# Patient Record
Sex: MALE | Race: WHITE | NOT HISPANIC OR LATINO | Employment: OTHER | URBAN - METROPOLITAN AREA
[De-identification: names, ages, dates, MRNs, and addresses within clinical notes are randomized per-mention and may not be internally consistent; named-entity substitution may affect disease eponyms.]

---

## 2017-10-17 ENCOUNTER — HOSPITAL ENCOUNTER (INPATIENT)
Facility: HOSPITAL | Age: 57
LOS: 2 days | Discharge: HOME | DRG: 065 | End: 2017-10-19
Attending: EMERGENCY MEDICINE | Admitting: NEUROLOGICAL SURGERY
Payer: COMMERCIAL

## 2017-10-17 DIAGNOSIS — I49.3 PVC (PREMATURE VENTRICULAR CONTRACTION): ICD-10-CM

## 2017-10-17 DIAGNOSIS — R00.2 HEART PALPITATIONS: ICD-10-CM

## 2017-10-17 DIAGNOSIS — I61.9 ICH (INTRACEREBRAL HEMORRHAGE): Primary | ICD-10-CM

## 2017-10-17 DIAGNOSIS — I44.0 AV BLOCK, 1ST DEGREE: ICD-10-CM

## 2017-10-17 DIAGNOSIS — R00.2 PALPITATIONS: ICD-10-CM

## 2017-10-17 LAB
APTT BLDCRRT: 23.7 SEC
INR PPP: 1
PROTHROMBIN TIME: 11.1 SEC

## 2017-10-17 PROCEDURE — 85610 PROTHROMBIN TIME: CPT

## 2017-10-17 PROCEDURE — 99233 SBSQ HOSP IP/OBS HIGH 50: CPT | Mod: ,,, | Performed by: PSYCHIATRY & NEUROLOGY

## 2017-10-17 PROCEDURE — 86850 RBC ANTIBODY SCREEN: CPT

## 2017-10-17 PROCEDURE — 99285 EMERGENCY DEPT VISIT HI MDM: CPT | Mod: 25

## 2017-10-17 PROCEDURE — 96361 HYDRATE IV INFUSION ADD-ON: CPT

## 2017-10-17 PROCEDURE — 12000002 HC ACUTE/MED SURGE SEMI-PRIVATE ROOM

## 2017-10-17 PROCEDURE — 96365 THER/PROPH/DIAG IV INF INIT: CPT

## 2017-10-17 PROCEDURE — 85730 THROMBOPLASTIN TIME PARTIAL: CPT

## 2017-10-17 PROCEDURE — 80307 DRUG TEST PRSMV CHEM ANLYZR: CPT

## 2017-10-17 PROCEDURE — 86900 BLOOD TYPING SEROLOGIC ABO: CPT

## 2017-10-17 PROCEDURE — 99284 EMERGENCY DEPT VISIT MOD MDM: CPT | Mod: ,,, | Performed by: EMERGENCY MEDICINE

## 2017-10-17 RX ORDER — ACEBUTOLOL HYDROCHLORIDE 200 MG/1
200 CAPSULE ORAL 2 TIMES DAILY
Status: DISCONTINUED | OUTPATIENT
Start: 2017-10-18 | End: 2017-10-19 | Stop reason: HOSPADM

## 2017-10-17 RX ORDER — ACETAMINOPHEN 10 MG/ML
1000 INJECTION, SOLUTION INTRAVENOUS EVERY 8 HOURS
Status: COMPLETED | OUTPATIENT
Start: 2017-10-17 | End: 2017-10-18

## 2017-10-17 RX ORDER — DIPHENHYDRAMINE HYDROCHLORIDE 50 MG/ML
50 INJECTION INTRAMUSCULAR; INTRAVENOUS ONCE
Status: COMPLETED | OUTPATIENT
Start: 2017-10-18 | End: 2017-10-18

## 2017-10-17 RX ORDER — IBUPROFEN 200 MG
24 TABLET ORAL
Status: DISCONTINUED | OUTPATIENT
Start: 2017-10-18 | End: 2017-10-19 | Stop reason: HOSPADM

## 2017-10-17 RX ORDER — FLUOXETINE HYDROCHLORIDE 20 MG/1
20 CAPSULE ORAL DAILY
Status: DISCONTINUED | OUTPATIENT
Start: 2017-10-18 | End: 2017-10-19 | Stop reason: HOSPADM

## 2017-10-17 RX ORDER — GLUCAGON 1 MG
1 KIT INJECTION
Status: DISCONTINUED | OUTPATIENT
Start: 2017-10-18 | End: 2017-10-19 | Stop reason: HOSPADM

## 2017-10-17 RX ORDER — SODIUM CHLORIDE 9 MG/ML
INJECTION, SOLUTION INTRAVENOUS CONTINUOUS
Status: DISCONTINUED | OUTPATIENT
Start: 2017-10-18 | End: 2017-10-18

## 2017-10-17 RX ORDER — IBUPROFEN 200 MG
16 TABLET ORAL
Status: DISCONTINUED | OUTPATIENT
Start: 2017-10-18 | End: 2017-10-19 | Stop reason: HOSPADM

## 2017-10-17 RX ORDER — PANTOPRAZOLE SODIUM 40 MG/1
40 TABLET, DELAYED RELEASE ORAL DAILY
Status: DISCONTINUED | OUTPATIENT
Start: 2017-10-18 | End: 2017-10-19 | Stop reason: HOSPADM

## 2017-10-18 PROBLEM — R00.2 HEART PALPITATIONS: Status: ACTIVE | Noted: 2017-10-18

## 2017-10-18 PROBLEM — I49.3 PVC (PREMATURE VENTRICULAR CONTRACTION): Status: ACTIVE | Noted: 2017-10-18

## 2017-10-18 LAB
ABO + RH BLD: NORMAL
AMPHET+METHAMPHET UR QL: NEGATIVE
ANION GAP SERPL CALC-SCNC: 10 MMOL/L
BARBITURATES UR QL SCN>200 NG/ML: NEGATIVE
BENZODIAZ UR QL SCN>200 NG/ML: NEGATIVE
BLD GP AB SCN CELLS X3 SERPL QL: NORMAL
BUN SERPL-MCNC: 15 MG/DL
BZE UR QL SCN: NEGATIVE
CALCIUM SERPL-MCNC: 8.9 MG/DL
CANNABINOIDS UR QL SCN: NEGATIVE
CHLORIDE SERPL-SCNC: 104 MMOL/L
CO2 SERPL-SCNC: 25 MMOL/L
CREAT SERPL-MCNC: 0.9 MG/DL
CREAT UR-MCNC: 104 MG/DL
DIASTOLIC DYSFUNCTION: NO
EST. GFR  (AFRICAN AMERICAN): >60 ML/MIN/1.73 M^2
EST. GFR  (NON AFRICAN AMERICAN): >60 ML/MIN/1.73 M^2
ESTIMATED PA SYSTOLIC PRESSURE: 22.09
ETHANOL UR-MCNC: <10 MG/DL
GLUCOSE SERPL-MCNC: 106 MG/DL
METHADONE UR QL SCN>300 NG/ML: NEGATIVE
MITRAL VALVE REGURGITATION: NORMAL
OPIATES UR QL SCN: NEGATIVE
PCP UR QL SCN>25 NG/ML: NEGATIVE
POTASSIUM SERPL-SCNC: 4.1 MMOL/L
RETIRED EF AND QEF - SEE NOTES: 65 (ref 55–65)
SODIUM SERPL-SCNC: 139 MMOL/L
TOXICOLOGY INFORMATION: NORMAL
TRICUSPID VALVE REGURGITATION: NORMAL
TSH SERPL DL<=0.005 MIU/L-ACNC: 1.1 UIU/ML

## 2017-10-18 PROCEDURE — 99233 SBSQ HOSP IP/OBS HIGH 50: CPT | Mod: ,,, | Performed by: PSYCHIATRY & NEUROLOGY

## 2017-10-18 PROCEDURE — 93005 ELECTROCARDIOGRAM TRACING: CPT

## 2017-10-18 PROCEDURE — 20600001 HC STEP DOWN PRIVATE ROOM

## 2017-10-18 PROCEDURE — 25000003 PHARM REV CODE 250: Performed by: STUDENT IN AN ORGANIZED HEALTH CARE EDUCATION/TRAINING PROGRAM

## 2017-10-18 PROCEDURE — 36415 COLL VENOUS BLD VENIPUNCTURE: CPT

## 2017-10-18 PROCEDURE — 93306 TTE W/DOPPLER COMPLETE: CPT

## 2017-10-18 PROCEDURE — 80048 BASIC METABOLIC PNL TOTAL CA: CPT

## 2017-10-18 PROCEDURE — 63600175 PHARM REV CODE 636 W HCPCS: Performed by: STUDENT IN AN ORGANIZED HEALTH CARE EDUCATION/TRAINING PROGRAM

## 2017-10-18 PROCEDURE — 99233 SBSQ HOSP IP/OBS HIGH 50: CPT | Mod: ,,, | Performed by: INTERNAL MEDICINE

## 2017-10-18 PROCEDURE — 84443 ASSAY THYROID STIM HORMONE: CPT

## 2017-10-18 PROCEDURE — 25500020 PHARM REV CODE 255: Performed by: NEUROLOGICAL SURGERY

## 2017-10-18 PROCEDURE — 93306 TTE W/DOPPLER COMPLETE: CPT | Mod: 26,,, | Performed by: INTERNAL MEDICINE

## 2017-10-18 PROCEDURE — 93010 ELECTROCARDIOGRAM REPORT: CPT | Mod: ,,, | Performed by: INTERNAL MEDICINE

## 2017-10-18 RX ORDER — ACETAMINOPHEN 325 MG/1
650 TABLET ORAL EVERY 6 HOURS PRN
Status: DISCONTINUED | OUTPATIENT
Start: 2017-10-18 | End: 2017-10-19 | Stop reason: HOSPADM

## 2017-10-18 RX ADMIN — FLUOXETINE 20 MG: 20 CAPSULE ORAL at 08:10

## 2017-10-18 RX ADMIN — PREDNISONE 50 MG: 20 TABLET ORAL at 12:10

## 2017-10-18 RX ADMIN — IOHEXOL 100 ML: 350 INJECTION, SOLUTION INTRAVENOUS at 04:10

## 2017-10-18 RX ADMIN — ACETAMINOPHEN 1000 MG: 10 INJECTION, SOLUTION INTRAVENOUS at 01:10

## 2017-10-18 RX ADMIN — ACETAMINOPHEN 1000 MG: 10 INJECTION, SOLUTION INTRAVENOUS at 05:10

## 2017-10-18 RX ADMIN — PANTOPRAZOLE SODIUM 40 MG: 40 TABLET, DELAYED RELEASE ORAL at 08:10

## 2017-10-18 RX ADMIN — ACETAMINOPHEN 650 MG: 325 TABLET ORAL at 10:10

## 2017-10-18 RX ADMIN — SODIUM CHLORIDE: 0.9 INJECTION, SOLUTION INTRAVENOUS at 12:10

## 2017-10-18 RX ADMIN — PREDNISONE 50 MG: 20 TABLET ORAL at 05:10

## 2017-10-18 RX ADMIN — ACETAMINOPHEN 1000 MG: 10 INJECTION, SOLUTION INTRAVENOUS at 12:10

## 2017-10-18 RX ADMIN — PREDNISONE 50 MG: 20 TABLET ORAL at 02:10

## 2017-10-18 RX ADMIN — ACEBUTOLOL HYDROCHLORIDE 200 MG: 200 CAPSULE ORAL at 08:10

## 2017-10-18 RX ADMIN — ACEBUTOLOL HYDROCHLORIDE 200 MG: 200 CAPSULE ORAL at 10:10

## 2017-10-18 RX ADMIN — DIPHENHYDRAMINE HYDROCHLORIDE 50 MG: 50 INJECTION, SOLUTION INTRAMUSCULAR; INTRAVENOUS at 12:10

## 2017-10-18 NOTE — ED PROVIDER NOTES
Encounter Date: 10/17/2017       History     Chief Complaint   [facility]   [facility]      pt presents to to the ed from [facility] for neurosurgical eval of a head GCS is 15 pt presents AAOX4     Patient is a 57 y.o. Man with new onset palpitations this morning. Patient became diaphoretic and light headed when he noticed it. He also reported new onset of headache at this time. Patient was transferred to [facility] for elevated level of care. Patient received in stable condition, neuro intact AAO x 4, complaining of only a slight headache. NAD, resting comfortably in bed. Patient denies any s/s of chest pain or discomfort at this time. Patient is neurologically intact GCS 15, no neuro deficits.     Neurosurgery was contacted and made aware of the patient came in from [facility] and is in room 6.           Review of patient's allergies indicates:   Allergen Reactions    Codeine Hallucinations    Iodine and iodide containing products Itching     Past Medical History:   Diagnosis Date    PVC (premature ventricular contraction)      Past Surgical History:   Procedure Laterality Date    APPENDECTOMY      CHOLECYSTECTOMY       No family history on file.  Social History   Substance Use Topics    Smoking status: Never Smoker    Smokeless tobacco: Never Used    Alcohol use No     Review of Systems   Constitutional: Negative for appetite change, chills, diaphoresis, fatigue and fever.   HENT: Negative for rhinorrhea and sore throat.    Eyes: Negative for itching and visual disturbance.   Respiratory: Negative for cough, choking and shortness of breath.    Cardiovascular: Negative for chest pain, palpitations and leg swelling.   Gastrointestinal: Negative for abdominal pain, diarrhea, nausea and vomiting.   Endocrine: Negative for cold intolerance and heat intolerance.   Genitourinary: Negative for difficulty urinating.   Musculoskeletal: Negative for arthralgias, back pain and myalgias.   Skin:  Negative for color change.   Allergic/Immunologic: Negative for environmental allergies and food allergies.   Neurological: Positive for headaches.   Hematological: Does not bruise/bleed easily.   Psychiatric/Behavioral: Negative for suicidal ideas. The patient is not nervous/anxious.        Physical Exam     Initial Vitals [10/17/17 2113]   BP Pulse Resp Temp SpO2   135/72 63 18 98.2 °F (36.8 °C) 100 %      MAP       93         Physical Exam    Nursing note and vitals reviewed.  Constitutional: He appears well-developed and well-nourished. He is not diaphoretic. No distress.   HENT:   Head: Normocephalic and atraumatic.   Eyes: EOM are normal. Pupils are equal, round, and reactive to light. No scleral icterus.   Neck: Normal range of motion. Neck supple. No thyromegaly present. No JVD present.   Cardiovascular: Normal rate and normal heart sounds.   No murmur heard.  Pulmonary/Chest: Breath sounds normal. He has no wheezes.   Abdominal: Soft. Bowel sounds are normal.   Musculoskeletal: Normal range of motion. He exhibits no edema.   Neurological: He is alert and oriented to person, place, and time. He has normal strength and normal reflexes. He displays normal reflexes. No cranial nerve deficit or sensory deficit.   Skin: Skin is warm. Capillary refill takes less than 2 seconds.         ED Course   Procedures  Labs Reviewed   PROTIME-INR   APTT   TOXICOLOGY SCREEN, URINE, RANDOM (COMPLIANCE)   CBC W/ AUTO DIFFERENTIAL   BASIC METABOLIC PANEL   TYPE & SCREEN             Medical Decision Making:   ED Management:  10:49 PM  -neurosurgery consulted and contacted on phone  -patient neurologically intact, AAOx4  -hemodynamically stable at present time, sleeping in bed.  -easily arrousable, still AAOx4 and neuro intact    10:53 PM  -neurosurgery at bedside              Attending Attestation:   Physician Attestation Statement for Resident:  As the supervising MD   Physician Attestation Statement: I have personally seen and  examined this patient.   I agree with the above history. -:   As the supervising MD I agree with the above PE.    As the supervising MD I agree with the above treatment, course, plan, and disposition.   -: Aox3, gcs 15, airway patent.  Neurosurgery consult for ICH.    2:39 AM pt admitted to neurosurgery for further eval/mgt.    I have reviewed the following: records from a referring facility.                    ED Course      Clinical Impression:   The encounter diagnosis was ICH (intracerebral hemorrhage).                           Jaime Roy MD  10/18/17 0238

## 2017-10-18 NOTE — CONSULTS
[facility]  Vascular Neurology  Comprehensive Stroke Center  Consult Note    Consults  Assessment/Plan:     PVC (premature ventricular contraction)    -Stroke risk factor. 2D Echo pending.        ICH (intracerebral hemorrhage)    57 y.o. male with significant past medical history of PVC presented to hospital as a transfer from [facility] for evaluation of ICH.   -Antithrombotics for secondary stroke prevention: None: Reason:  Hemorrhagic Stroke  -Statins for secondary stroke prevention and hyperlipidemia, if present: Atorvastatin- 40 mg oral daily (may hold in setting of ICH)  -Aggressive risk factor modification: PVCs  -Rehab Efforts: None: Reason: No deficits  -Diagnostics: Pending CTA Head to assess vasculature , CTA Neck/Arch to assess vasculature, HgbA1C to assess blood glucose levels, Lipid Profile to assess cholesterol levels, MRI head without contrast to assess brain parenchyma, Trans-thoracic cardiac echo to assess cardiac function/status, TSH to assess thyroid function  -VTE Prophylaxis: None: Reason for No Pharmacological VTE Prophylaxis: HIstory of systemic or intracranial bleeding and Mechanical prophylaxis: Place SCDs    -SBP<160            Thrombolysis Candidate? No  1. Contraindications: History of intracranial hemorrhage or brain aneurysm or vascular malformation or brain tumor    Interventional Revascularization Candidate?  No; No large vessel occlusion    Research Candidate? Yes:  Other: MARISS    Subjective:     History of Present Illness:  Patient is a 57 y.o. male with significant past medical history of PVC presented to hospital as a transfer from [facility] for evaluation of ICH.  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  He also felt light headed.  Nothing preciptated or alleviated his symptoms.  On arrival to the [facility] ED the  "patient reported acute onset HA.  The patient denies N/V, fever, chills, or CP.  His initial BP was 180s/90s.  A CTH was obtained and revealed an IPH.  The patient was transferred to [facility] for further evaluation, close monitoring.  On arrival to the ED the patient remains w/complaint of HA, mild photophobia.  He denies N/V, weakness, numbness.  The patient endorses intermittent chest discomfort described as "fluttering".  The patient admitted by NSGY for further evaluation of ICH.                               Past Medical History:   Diagnosis Date    PVC (premature ventricular contraction)      Past Surgical History:   Procedure Laterality Date    APPENDECTOMY      CHOLECYSTECTOMY       History reviewed. No pertinent family history.  Social History   Substance Use Topics    Smoking status: Never Smoker    Smokeless tobacco: Never Used    Alcohol use No     Review of patient's allergies indicates:   Allergen Reactions    Codeine Hallucinations    Iodine and iodide containing products Itching     Medications: I have reviewed the current medication administration record.    Prescriptions Prior to Admission   Medication Sig Dispense Refill Last Dose    acebutolol (SECTRAL) 200 MG capsule Take 200 mg by mouth 2 (two) times daily.   10/17/2017    fluoxetine (PROZAC) 20 MG capsule Take 20 mg by mouth once daily.   10/17/2017    lansoprazole (PREVACID) 30 MG capsule Take 30 mg by mouth once daily.   10/17/2017       Review of Systems   Constitutional: Positive for diaphoresis. Negative for chills and fever.   HENT: Negative for drooling and trouble swallowing.    Eyes: Positive for photophobia. Negative for pain, redness and visual disturbance.   Respiratory: Negative for cough and shortness of breath.    Cardiovascular: Positive for palpitations. Negative for chest pain.   Gastrointestinal: Negative for abdominal pain, nausea and vomiting.   Endocrine: Negative for cold intolerance, heat intolerance " and polydipsia.   Genitourinary: Negative for dysuria and hematuria.   Musculoskeletal: Negative for neck pain and neck stiffness.   Skin: Negative for rash.   Allergic/Immunologic: Negative for environmental allergies and food allergies.   Neurological: Positive for light-headedness and headaches. Negative for dizziness, facial asymmetry, speech difficulty and weakness.   Hematological: Negative for adenopathy. Does not bruise/bleed easily.   Psychiatric/Behavioral: Negative for confusion.     Objective:     Vital Signs (Most Recent):  Temp: 98.2 °F (36.8 °C) (10/17/17 2113)  Pulse: (!) 54 (10/18/17 0102)  Resp: 19 (10/18/17 0112)  BP: 129/61 (10/18/17 0003)  SpO2: 97 % (10/18/17 0001)    Vital Signs Range (Last 24H):  Temp:  [97.4 °F (36.3 °C)-98.2 °F (36.8 °C)]   Pulse:  [50-63]   Resp:  [16-20]   BP: (129-186)/(61-91)   SpO2:  [97 %-100 %]     Physical Exam   Constitutional: He is oriented to person, place, and time. He appears well-developed and well-nourished.   HENT:   Head: Normocephalic and atraumatic.   Right Ear: External ear normal.   Left Ear: External ear normal.   Nose: Nose normal.   Mouth/Throat: Oropharynx is clear and moist.   Eyes: Conjunctivae and EOM are normal. Pupils are equal, round, and reactive to light. Right eye exhibits no discharge. Left eye exhibits no discharge. No scleral icterus.   Neck: Normal range of motion. Neck supple. No thyromegaly present.   Cardiovascular: Regular rhythm.  Bradycardia present.    Pulmonary/Chest: Effort normal.   Abdominal: He exhibits no distension. There is no tenderness.   Musculoskeletal: Normal range of motion. He exhibits no edema.   Lymphadenopathy:     He has no cervical adenopathy.   Neurological: He is alert and oriented to person, place, and time. GCS eye subscore is 4 - spontaneous. GCS verbal subscore is 5 - oriented. GCS motor subscore is 6 - obeys commands.   Skin: Skin is warm and dry. He is not diaphoretic.   Nursing note and vitals  reviewed.      Neurological Exam:   LOC: alert and follows requests  Language: No aphasia  Speech: No dysarthria  Orientation: Person, Place, Time  Visual Fields (CN II): Full  EOM (CN III, IV, VI): Full/intact  Pupils (CN III, IV, VI): PERRL  Facial Movement (CN VII): symmetric facial expression  Motor*: Arm Left:  Normal (5/5), Leg Left:   Normal (5/5), Arm Right:   Normal (5/5), Leg Right:   Normal (5/5)  Cerebellar*: no dysmetria  Sensation: intact to light touch, temperature and vibration    NIH Stroke Scale:  Interval: baseline (upon arrival/admit)  Level of Consciousness: 0 - alert  LOC Questions: 0 - answers both correctly  LOC Commands: 0 - performs both correctly  Best Gaze: 0 - normal  Visual: 0 - no visual loss  Facial Palsy: 0 - normal  Motor Left Arm: 0 - no drift  Motor Right Arm: 0 - no drift  Motor Left Le - no drift  Motor Right Le - no drift  Limb Ataxia: 0 - absent  Sensory: 0 - normal  Best Language: 0 - no aphasia  Dysarthria: 0 - normal articulation  Extinction and Inattention: 0 - no neglect  NIH Stroke Scale Total: 0  Phoebe Coma Scale:  Best Eye Response: 4 - spontaneous  Best Motor Response: 6 - obeys commands  Best Verbal Response: 5 - oriented  Parlin Coma Scale Total: 15  Modified Vanduser Scale:   Timeline: Prior to symptoms onset  Modified Jeanine Score: 0 - no symptoms    ICH Scale:   Phoebe Coma Score: 0 - 13-15  Age > or = 80: 0 - no  ICH Volume > or = 30 mL: 0 - no  Intraventricular Hemorrhage: 0 - no  Infratentorial Origin of Hemorrhage: 0 - no  ICH Scale Total: 0      Laboratory:  CMP:   Recent Labs  Lab 10/17/17  1343   CALCIUM 10.0      K 4.7   CO2 30*      BUN 12   CREATININE 1.00     CBC:   Recent Labs  Lab 10/17/17  1343   WBC 7.90   RBC 4.59*   HGB 14.0   HCT 41.4      MCV 90   MCH 30.4   MCHC 33.8     Lipid Panel: No results for input(s): CHOL, LDLCALC, HDL, TRIG in the last 168 hours.  Coagulation:   Recent Labs  Lab 10/17/17  2303   INR 1.0    APTT 23.7     Hgb A1C: No results for input(s): HGBA1C in the last 168 hours.  TSH: No results for input(s): TSH in the last 168 hours.    Diagnostic Results:  Brain Imaging: CT Head. Date: 10/18/17  Acute Pathology: ICH  Location: Parietal:  right  Old Vascular Pathology: None  Location: N/A    Cerebrovascular Imaging: pending    Cervical Vascular Imaging: pending    Cardiac Evaluation: pending  EKG/Telemetry: Sinus rhythm, bradycardia      Jeri Reaves NP  [facility]  Department of Vascular Neurology   [facility]

## 2017-10-18 NOTE — ED TRIAGE NOTES
Rubio Rodriguez, a 57 y.o. male presents to the ED as transfer from [facility] for neurosurgery consult. Pt reports palpitations earlier this morning while on boat. Pt was sent to [facility], and was diagnosed with a parenchymal 7mm Head bleed. Pt in NAD.       Chief Complaint   Patient presents with    transfer [facility]      pt presents to to the ed from [facility] for neurosurgical eval of a head GCS is 15 pt presents AAOX4     Review of patient's allergies indicates:   Allergen Reactions    Codeine Hallucinations    Iodine and iodide containing products Itching     Past Medical History:   Diagnosis Date    PVC (premature ventricular contraction)      LOC: Patient name and date of birth verified.  The patient is awake, alert and aware of environment with an appropriate affect, the patient is oriented x 3 and speaking appropriately.  Pt in NAD.    APPEARANCE: Patient resting comfortably and in no acute distress, patient is clean and well groomed, patient's clothing is properly fastened.  SKIN: The skin is warm and dry, color consistent with ethnicity, patient has normal skin turgor and moist mucus membranes, skin intact, no breakdown or brusing noted.  MUSCULOSKELETAL: Patient moving all extremities well, no obvious swelling or deformities noted.  RESPIRATORY: Airway is open and patent, respirations are spontaneous, patient has a normal effort and rate, no accessory muscle use noted.  CARDIAC: Patient has a normal rate and rhythm, no peripheral edema noted, capillary refill < 3 seconds.  ABDOMEN: Soft and non tender to palpation, no distention noted. Bowel sounds present in all four quadrants.  NEUROLOGIC: Eyes open spontaneously, behavior appropriate to situation, follows commands, facial expression symmetrical, bilateral hand grasp equal and even, purposeful motor response noted, normal sensation in all extremities when touched with a finger. Pt reports headache.

## 2017-10-18 NOTE — H&P
History and Physical  Neurosurgery    Admit Date: 10/17/2017  LOS: 1    Code Status: Full Code     CC: <principal problem not specified>    SUBJECTIVE:     History of Present Illness: 58 yo male with pmh of SVT presents as transfer for spontaneous R parietal ICH.    Pt says he felt a strange feeling in his chest and then developed a headache. He continues to have a moderate headache. No nuchal rigidty or phtophobia.     Pt is neurologically intact on exam.    Outside head CT shows small region of hyperdensity in R parietal lobe. MRI from OSH show interval stability with no evidence of mass.    Neurosurgery is consulted for ICH.    ICHS 0.             OBJECTIVE:   Vital Signs (Most Recent):   Temp: 98.2 °F (36.8 °C) (10/17/17 2113)  Pulse: (!) 54 (10/18/17 0102)  Resp: 19 (10/18/17 0112)  BP: 129/61 (10/18/17 0003)  SpO2: 97 % (10/18/17 0001)    Vital Signs (24h Range):   Temp:  [97.4 °F (36.3 °C)-98.2 °F (36.8 °C)] 98.2 °F (36.8 °C)  Pulse:  [50-63] 54  Resp:  [16-20] 19  SpO2:  [97 %-100 %] 97 %  BP: (129-186)/(61-91) 129/61      I & O (Last 24h):  No intake or output data in the 24 hours ending 10/18/17 0308    Physical Exam:  General: well developed, well nourished, no distress.   Head: normocephalic, atraumatic  Cervical Spine: No midline tenderness to palpation.  Thoracolumbosacral Spine: No midline tenderness to palpation.  GCS: Motor: 6/Verbal: 5/Eyes: 4 GCS Total: 15  Mental Status: Awake, Alert, Oriented x 4  Language: No aphasia  Speech: No dysarthria  Facial Droop: None   Cranial nerves: CN III-XII grossly intact.  Visual Fields: Intact.   Eyes: Pupils equal and reactive to light. Intact Conjugate horizontal and vertical pursuit. No nystagmus. No gaze deviation.   Pulmonary: No distress.  Sensory: No deficit.  Propioception: No deficit in 1st digit of toe bilaterally.  Rectal Tone: Not tested.  Drift: None.  Upper Extremity Ataxia: No Dysmetria Bilaterally  Lower Extremity Ataxia: Not  tested.  Dysdiadochokinesia: Not tested.  Reflexes: 2+ patellar bilaterally.  Escobar: Absent  Clonus: Absent  Babinski: Absent  Romberg: Not Tested  Pulses: Brisk and symmetric radial, DP and tibial pulses.  Motor Strength:    Strength  Shoulder Abduction Elbow Extension Elbow Flexion Wrist Extension Wrist Flexion Finger Opposition Finger Add Finger Abd   Upper: R 5/5 5/5 5/5 5/5 5/5 5/5 5/5 5/5    L 5/5 5/5 5/5 5/5 5/5 5/5 5/5 5/5     Hip Flexion Knee Extension Knee  Flexion Ankle Dflexion Ankle Pflexion EHL     Lower: R 5/5 5/5 5/5 5/5 5/5 5/5      L 5/5 5/5 5/5 5/5 5/5 5/5           Lines/Drains/Airway:          Nutrition/Tube Feeds:   Current Diet Order   Procedures    Diet NPO       Labs:  ABG: No results for input(s): PH, PO2, PCO2, HCO3, POCSATURATED, BE in the last 24 hours.  BMP:  Recent Labs  Lab 10/17/17  1343      K 4.7      CO2 30*   BUN 12   CREATININE 1.00   GLU 91     LFT: No results found for: AST, ALT, GGT, ALKPHOS, BILITOT, ALBUMIN, PROT  CBC:   Lab Results   Component Value Date    WBC 7.90 10/17/2017    HGB 14.0 10/17/2017    HCT 41.4 10/17/2017    MCV 90 10/17/2017     10/17/2017     Microbiology x 7d:   Microbiology Results (last 7 days)     ** No results found for the last 168 hours. **            ASSESSMENT/PLAN:   58 yo male with spontaneous R parietal ICH. ICHS 0. Neurologically intact on exam.     --No acute neurosurgical intervention required.  --Admit to neurosurgery under floor status.  --Begin q4 neurochecks.  --Begin q4 vital checks.  --Will obtain CTA head and neck.  --Pt with iodine allergy, will administer ppx prior to scan.  --Please keep pt NPO.  --SBP < 160.  --We will continue to monitor closely, please contact us with any questions or concerns.    Lionel Saleh

## 2017-10-18 NOTE — ASSESSMENT & PLAN NOTE
Assessment  57M with reported history of SVT, PVCs on acebutolol presents with ICH and reported heart palpitations. No tachyarrhythmias captured on EKGs during this admission. Patient has not been continuously monitored. History reveals no exogenous cause for palpitations. Serum studies thus far not suggestive of endocrine abnormalities.     Plan  - Start cardiac telemetry monitoring while inpatient  - Continue home acebutolol  - Perform 2D Echo  - Obtain TSH level  - Discharge with auto-trigger cardiac event monitor for 2 weeks to be followed up by patient's Cardiologist at [facility] in [place]

## 2017-10-18 NOTE — PLAN OF CARE
SW following for DC needs. SW in communication with CM.    Reyna Tobar, [facility]  [id number]

## 2017-10-18 NOTE — HOSPITAL COURSE
10/18: CTA Head Pending.  2D Echo pending.  Medicine consulted for h/o PVCs and c/o palpitations.  On telemetry.   10/19: CTA shows possible cavernoma.  has recommended discharge home with holter monitor with outpatient cardiologist follow up.

## 2017-10-18 NOTE — PLAN OF CARE
CM met with patient and spouse this am. Patient lying in bed. Planned discharge is home with family - Plan (A) or home with family and Home Health - Plan (B).    PCP:  Philip Hutton MD     Payor: BLUE CROSS BLUE SHIELD / Plan: BCBS ALL OUT OF STATE / Product Type: PPO /      Pharmacy:  [facility]  [place] and [place]JEL Osborne       10/18/17 1142   Discharge Assessment   Assessment Type Discharge Planning Assessment   Confirmed/corrected address and phone number on facesheet? Yes   Assessment information obtained from? Patient   Expected Length of Stay (days) 1   Communicated expected length of stay with patient/caregiver yes   Prior to hospitilization cognitive status: Alert/Oriented   Prior to hospitalization functional status: Independent   Current cognitive status: Alert/Oriented   Current Functional Status: Independent   Lives With spouse   Able to Return to Prior Arrangements yes   Is patient able to care for self after discharge? Yes   Who are your caregiver(s) and their phone number(s)? Clara Rodriguez - spouse [phone]   Patient's perception of discharge disposition home or selfcare   Readmission Within The Last 30 Days no previous admission in last 30 days   Patient currently being followed by outpatient case management? No   Patient currently receives any other outside agency services? No   Equipment Currently Used at Home none   Do you have any problems affording any of your prescribed medications? No   Is the patient taking medications as prescribed? yes   Does the patient have transportation home? Yes   Transportation Available family or friend will provide   Does the patient receive services at the [facility]madin Clinic? No   Discharge Plan A Home with family   Discharge Plan B Home with family;Home Health   Patient/Family In Agreement With Plan yes

## 2017-10-18 NOTE — PLAN OF CARE
Problem: Patient Care Overview  Goal: Plan of Care Review  Outcome: Ongoing (interventions implemented as appropriate)  POC reviewed with patient and spouse at 0330 upon arrival to the floor. AAOx4. VS remained stable throughout the shift. Afebrile. Pt remained free of falls and injuries during the night. Safety precautions remained in place. No new neuro changes. No new skin impairments noted. No c/o pain or nausea/vomiting. Pt will remain NPO until orders state otherwise. Bed locked and low, side rails up x2, with call light in reach and spouse to remain at bedside. Will continue to monitor.

## 2017-10-18 NOTE — ED PROVIDER NOTES
Encounter Date: 10/17/2017       History     Chief Complaint   Patient presents with    transfer terrebone      pt presents to to the ed from [facility] for neurosurgical eval of a head GCS is 15 pt presents AAOX4     Patient received AAOx4 from [facility], with known head bleed. Neuro intact at bedside exam. Patient reports that the bleed was found incidentally during a cardiac workup at [facility].     Patient denies any complaints except a slight diffuse headache that has been present since his palpitations and chest pain that occurred this morning.           Review of patient's allergies indicates:   Allergen Reactions    Codeine Hallucinations    Iodine and iodide containing products Itching     Past Medical History:   Diagnosis Date    PVC (premature ventricular contraction)      Past Surgical History:   Procedure Laterality Date    APPENDECTOMY      CHOLECYSTECTOMY       History reviewed. No pertinent family history.  Social History   Substance Use Topics    Smoking status: Never Smoker    Smokeless tobacco: Never Used    Alcohol use No     Review of Systems   Constitutional: Negative for appetite change, chills, fatigue and fever.   HENT: Negative for rhinorrhea and sore throat.    Eyes: Negative for itching.   Respiratory: Negative for cough and shortness of breath.    Cardiovascular: Negative for chest pain.   Gastrointestinal: Negative for abdominal pain, diarrhea, nausea and vomiting.   Endocrine: Negative for cold intolerance and heat intolerance.   Genitourinary: Negative for difficulty urinating.   Musculoskeletal: Negative for arthralgias, back pain and myalgias.   Skin: Negative for color change.   Allergic/Immunologic: Negative for environmental allergies and food allergies.   Neurological: Positive for headaches.   Hematological: Does not bruise/bleed easily.   Psychiatric/Behavioral: Negative for suicidal ideas. The patient is not nervous/anxious.        Physical Exam     Initial Vitals  [10/17/17 2113]   BP Pulse Resp Temp SpO2   135/72 63 18 98.2 °F (36.8 °C) 100 %      MAP       93         Physical Exam    Vitals reviewed.  Constitutional: He appears well-developed and well-nourished. He is not diaphoretic. No distress.   HENT:   Head: Normocephalic and atraumatic.   Eyes: EOM are normal. Pupils are equal, round, and reactive to light. No scleral icterus.   Neck: Normal range of motion. Neck supple.   Cardiovascular: Normal heart sounds and intact distal pulses.   No murmur heard.  Pulmonary/Chest: Breath sounds normal. No respiratory distress.   Abdominal: Soft. Bowel sounds are normal. He exhibits no distension.   Musculoskeletal: Normal range of motion. He exhibits no tenderness.   Neurological: He is alert and oriented to person, place, and time. He has normal reflexes. No cranial nerve deficit or sensory deficit.   Skin: Skin is warm and dry. No erythema. No pallor.   Psychiatric: He has a normal mood and affect.         ED Course   Procedures  Labs Reviewed   PROTIME-INR   APTT   TOXICOLOGY SCREEN, URINE, RANDOM (COMPLIANCE)   TYPE & SCREEN             Medical Decision Making:   Initial Assessment:   -AAOx4  -neurosurgery called to let them know their patient arrived from [facility]    Differential Diagnosis:   -subarachnoid bleed as per outside records   ED Management:  -neurosurgery at bedside               Attending Attestation:   Physician Attestation Statement for Resident:  As the supervising MD   Physician Attestation Statement: I have personally seen and examined this patient.   I agree with the above history. -:   As the supervising MD I agree with the above PE.    As the supervising MD I agree with the above treatment, course, plan, and disposition.  I have reviewed the following: records from a referring facility.                    ED Course      Clinical Impression:   Brain bleed - transferred for neurosurgical elevated care                          Isrrael Encinas  MD  Resident  10/18/17 0253       Jaime Roy MD  10/25/17 0054

## 2017-10-18 NOTE — SUBJECTIVE & OBJECTIVE
Interval History:  NAEON.  Pt states mild HAs. Continued intermittent c/o palpitations.  Denies N/V, visual changes, weakness, or seizures.        Medications:  Continuous Infusions:   sodium chloride 0.9% 100 mL/hr at 10/18/17 0043     Scheduled Meds:   acebutolol  200 mg Oral BID    fluoxetine  20 mg Oral Daily    pantoprazole  40 mg Oral Daily     PRN Meds:dextrose 50%, dextrose 50%, glucagon (human recombinant), glucose, glucose, glucose     Review of Systems  Objective:     Weight: 69.4 kg (153 lb)  Body mass index is 27.1 kg/m².  Vital Signs (Most Recent):  Temp: 97.8 °F (36.6 °C) (10/18/17 1710)  Pulse: 65 (10/18/17 1710)  Resp: 16 (10/18/17 1710)  BP: (!) 141/70 (10/18/17 1710)  SpO2: (!) 93 % (10/18/17 1710) Vital Signs (24h Range):  Temp:  [97.4 °F (36.3 °C)-98.2 °F (36.8 °C)] 97.8 °F (36.6 °C)  Pulse:  [51-65] 65  Resp:  [16-19] 16  SpO2:  [92 %-100 %] 93 %  BP: (126-167)/(61-88) 141/70                    Neurosurgery Physical Exam   General: no distress  Neurologic: Alert and oriented. Thought content appropriate.  Head: normocephalic  GCS: Motor: 6/Verbal: 5/Eyes: 4 GCS Total: 15  Cranial nerves: face symmetric, tongue midline, pupils equal, round, reactive to light with accomodation, extraocular muscles intact  Sensory: response to light touch throughout  Motor Strength:full strength upper and lower extremities  Pronator Drift: no drift noted  Finger to nose normal  No focal numbness or weakness  Lungs:  normal respiratory effort  Abdomen: soft, non-tender   Extremities: no cyanosis or edema, or clubbing      Significant Labs:    Recent Labs  Lab 10/17/17  1343 10/18/17  1354   GLU 91 106    139   K 4.7 4.1    104   CO2 30* 25   BUN 12 15   CREATININE 1.00 0.9   CALCIUM 10.0 8.9       Recent Labs  Lab 10/17/17  1343   WBC 7.90   HGB 14.0   HCT 41.4          Recent Labs  Lab 10/17/17  2303   INR 1.0   APTT 23.7     Microbiology Results (last 7 days)     ** No results found for the  last 168 hours. **          Significant Diagnostics: personally reviewed  MRI: Mri Brain W Wo Contrast    Result Date: 10/17/2017  7 mm parenchymal hemorrhage within the right parietal lobe. Electronically signed by: CHARLES VAUGHN MD Date:     10/17/17 Time:    18:45

## 2017-10-18 NOTE — PLAN OF CARE
Problem: Patient Care Overview  Goal: Plan of Care Review  Outcome: Ongoing (interventions implemented as appropriate)  Plan of care reviewed with patient and family.  Verbalized understanding.  Patient is alert and oriented time 4.  No acute neuro changes noticed. VSS. Resting comfortably in bed.  WIll continue to monitor.

## 2017-10-18 NOTE — SUBJECTIVE & OBJECTIVE
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 Echo reported today. Complains of headache and photophobia only. Denies weakness or numbness. Alert and oriented.     HPI, Past Medical, Family, and Social History remains the same as documented in the initial encounter.     Review of Systems   Constitutional: Negative for fever.   Eyes: Positive for photophobia.   Neurological: Positive for headaches. Negative for weakness and numbness.   Psychiatric/Behavioral: Negative for agitation and behavioral problems.     Scheduled Meds:   acebutolol  200 mg Oral BID    fluoxetine  20 mg Oral Daily    pantoprazole  40 mg Oral Daily     Continuous Infusions:   sodium chloride 0.9% 100 mL/hr at 10/18/17 0043     PRN Meds:dextrose 50%, dextrose 50%, glucagon (human recombinant), glucose, glucose, glucose    Objective:     Vital Signs (Most Recent):  Temp: 97.8 °F (36.6 °C) (10/18/17 1710)  Pulse: 65 (10/18/17 1710)  Resp: 16 (10/18/17 1710)  BP: (!) 141/70 (10/18/17 1710)  SpO2: (!) 93 % (10/18/17 1710)  BP Location: Right arm    Vital Signs Range (Last 24H):  Temp:  [97.5 °F (36.4 °C)-98.2 °F (36.8 °C)]   Pulse:  [52-65]   Resp:  [16-19]   BP: (126-141)/(61-78)   SpO2:  [92 %-100 %]   BP Location: Right arm    Physical Exam   Constitutional: He is oriented to person, place, and time. He appears well-developed and well-nourished.   Cardiovascular: Normal rate.    Musculoskeletal: Normal range of motion.   Neurological: He is alert and oriented to person, place, and time.   Skin: Skin is warm and dry.   Nursing note and vitals reviewed.      Neurological Exam:   LOC: alert and follows requests  Language: No aphasia  Speech: No dysarthria  EOM (CN III, IV, VI): Full/intact  Motor*: Arm Left:  Normal (5/5), Leg Left:   Normal (5/5), Arm Right:   Normal (5/5), Leg  Right:   Normal (5/5)  Sensation: intact to light touch, temperature and vibration  Tone: Arm-Left: normal; Leg-Left: normal; Arm-Right: normal; Leg-Right: normal    NIH Stroke Scale:    Level of Consciousness: 0 - alert  LOC Questions: 0 - answers both correctly  LOC Commands: 0 - performs both correctly  Best Gaze: 0 - normal  Visual: 0 - no visual loss  Facial Palsy: 0 - normal  Motor Left Arm: 0 - no drift  Motor Right Arm: 0 - no drift  Motor Left Le - no drift  Motor Right Le - no drift  Limb Ataxia: 0 - absent  Sensory: 0 - normal  Best Language: 0 - no aphasia  Dysarthria: 0 - normal articulation  Extinction and Inattention: 0 - no neglect  NIH Stroke Scale Total: 0      Laboratory:  CMP:   Recent Labs  Lab 10/18/17  1354   CALCIUM 8.9      K 4.1   CO2 25      BUN 15   CREATININE 0.9     CBC:   Recent Labs  Lab 10/17/17  1343   WBC 7.90   RBC 4.59*   HGB 14.0   HCT 41.4      MCV 90   MCH 30.4   MCHC 33.8     Lipid Panel: No results for input(s): CHOL, LDLCALC, HDL, TRIG in the last 168 hours.  Coagulation:   Recent Labs  Lab 10/17/17  2303   INR 1.0   APTT 23.7     Platelet Aggregation Study: No results for input(s): PLTAGG, PLTAGINTERP, PLTAGREGLACO, ADPPLTAGGREG in the last 168 hours.  Hgb A1C: No results for input(s): HGBA1C in the last 168 hours.  TSH:   Recent Labs  Lab 10/18/17  1354   TSH 1.104       Diagnostic Results:  I have personally reviewed:   CT head 10/17/17  7 mm density within the right parietal cortex this could represent a small hemorrhage or parenchymal calcification.  No significant mass effect.  Followup MRI with contrast is suggested.    MRI brain 10/17/17  7 mm parenchymal hemorrhage within the right parietal lobe.    CTA head and neck 10/18/17   Stable Subcentimeter cavernoma right parietal lobe corresponds to abnormality seen on prior MRI and CT. No evidence for new or recent hemorrhage.. Otherwise unremarkable CT head as detailed above.    CTA head:  Incidental small left posterior communicating artery infundibulum. No evidence for intracranial aneurysm or focal proximal stenosis.    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      Subcentimeter hyperdensity subcutaneous soft tissues overlying the mid left sternocleidomastoid suggestive for embedded metallic foreign body clinical correlation advised    Echo 10/18/17  LA normal   CONCLUSIONS     1 - Normal left ventricular systolic function (EF 60-65%).     2 - Concentric remodeling.     3 - No wall motion abnormalities.     4 - Normal left ventricular diastolic function.     5 - Normal right ventricular systolic function .     6 - Trivial mitral regurgitation.     7 - Trivial to mild tricuspid regurgitation.     8 - The estimated PA systolic pressure is greater than 22 mmHg.

## 2017-10-18 NOTE — ASSESSMENT & PLAN NOTE
57 y.o. male with significant past medical history of PVC presented to hospital as a transfer from [facility] for evaluation of ICH.   -Antithrombotics for secondary stroke prevention: None: Reason:  Hemorrhagic Stroke  -Statins for secondary stroke prevention and hyperlipidemia, if present: Atorvastatin- 40 mg oral daily (may hold in setting of ICH)  -Aggressive risk factor modification: PVCs  -Rehab Efforts: None: Reason: No deficits  -Diagnostics: Pending CTA Head to assess vasculature , CTA Neck/Arch to assess vasculature, HgbA1C to assess blood glucose levels, Lipid Profile to assess cholesterol levels, MRI head without contrast to assess brain parenchyma, Trans-thoracic cardiac echo to assess cardiac function/status, TSH to assess thyroid function  -VTE Prophylaxis: None: Reason for No Pharmacological VTE Prophylaxis: HIstory of systemic or intracranial bleeding and Mechanical prophylaxis: Place SCDs    -SBP<160

## 2017-10-18 NOTE — CONSULTS
"[facility] Hospital Medicine  Consult Note    Patient Name: Rubio Rodriguez  MRN: [id number]  Admission Date: 10/17/2017  Hospital Length of Stay: 1 days  Attending Physician: Presley Rivero MD   Primary Care Provider: Philip Hutton MD     [facility] Medicine Team: Networked reference to record PCT  Jigna Weems MD      Patient information was obtained from patient, spouse/SO and past medical records.     Inpatient consult to [facility] Medicine-General  Consult performed by: SARABJIT ESPINOSA  Consult ordered by: TAL GIBBONS        Subjective:     Principal Problem: <principal problem not specified>    Chief Complaint:   Chief Complaint   [facility]      pt presents to to the ed from [facility] for neurosurgical eval of a head GCS is 15 pt presents AAOX4        HPI: 57M with reported history of SVT and PCVs on acebutolol transferred from [facility] for further evaluation of ICH by Neurosurgery. Hospital medicine consulted for "new onset palpitations, PVC".    The patient was in his usual state of health yesterday morning. 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 He traveled to [facility]. On arrival, he was found to have /91, sinus bradycardia on ECG with 1st degree AV block, and no detectable troponins. CT/MRI head revealed 7 mm parenchymal hemorrhage within the right parietal lobe for which he was transferred to [facility] for further evaluation.    The patient is well established with [facility] [place] [facility] the [facility] in [place] where he has been followed for 17yrs. He has a prior history of SVT and PVCs for which he is treated with acebutolol. He is fully compliant. He reports infrequent mild sensations of heart palpitations which occur about once per month and diminished in intensity and frequency since starting beta blockade therapy. He denies " any prior episodes like the present one, prior issues with thyroid disease or other endocrine dysfunction. Denies recent medication changes, recreational drug use, or abnormal events.    Past Medical History:   Diagnosis Date    PVC (premature ventricular contraction)        Past Surgical History:   Procedure Laterality Date    APPENDECTOMY      CHOLECYSTECTOMY         Review of patient's allergies indicates:   Allergen Reactions    Codeine Hallucinations    Iodine and iodide containing products Itching       Current Facility-Administered Medications on File Prior to Encounter   Medication    [COMPLETED] gadobutrol 7 mL    [COMPLETED] lorazepam injection 0.5 mg    [DISCONTINUED] niCARdipine 40 mg/200 mL infusion     Current Outpatient Prescriptions on File Prior to Encounter   Medication Sig    acebutolol (SECTRAL) 200 MG capsule Take 200 mg by mouth 2 (two) times daily.    fluoxetine (PROZAC) 20 MG capsule Take 20 mg by mouth once daily.    lansoprazole (PREVACID) 30 MG capsule Take 30 mg by mouth once daily.     Family History     None        Social History Main Topics    Smoking status: Never Smoker    Smokeless tobacco: Never Used    Alcohol use No    Drug use: No    Sexual activity: Yes     Partners: Female     Review of Systems   Constitutional: Negative for chills, fatigue and fever.   HENT: Negative for congestion and rhinorrhea.    Eyes: Negative for pain and redness.   Respiratory: Negative for cough, chest tightness, shortness of breath and wheezing.    Cardiovascular: Positive for palpitations. Negative for chest pain and leg swelling.   Gastrointestinal: Negative for abdominal pain, constipation, diarrhea, nausea and vomiting.   Endocrine: Negative for cold intolerance, heat intolerance, polydipsia and polyuria.   Genitourinary: Negative for dysuria and hematuria.   Musculoskeletal: Negative for arthralgias and myalgias.   Allergic/Immunologic: Negative for environmental allergies, food  allergies and immunocompromised state.   Neurological: Negative for dizziness, light-headedness and headaches.   Hematological: Negative for adenopathy. Does not bruise/bleed easily.   Psychiatric/Behavioral: Negative for agitation and confusion.     Objective:     Vital Signs (Most Recent):  Temp: 97.6 °F (36.4 °C) (10/18/17 1235)  Pulse: 60 (10/18/17 1235)  Resp: 16 (10/18/17 1235)  BP: (!) 141/70 (10/18/17 1235)  SpO2: (!) 93 % (10/18/17 1235) Vital Signs (24h Range):  Temp:  [97.4 °F (36.3 °C)-98.2 °F (36.8 °C)] 97.6 °F (36.4 °C)  Pulse:  [50-63] 60  Resp:  [16-19] 16  SpO2:  [92 %-100 %] 93 %  BP: (126-178)/(61-88) 141/70     Weight: 69.4 kg (153 lb)  Body mass index is 27.1 kg/m².    Physical Exam   Constitutional: He is oriented to person, place, and time. He appears well-developed and well-nourished. No distress.   HENT:   Head: Normocephalic and atraumatic.   Eyes: EOM are normal. Pupils are equal, round, and reactive to light.   Neck: Normal range of motion. Neck supple.   Cardiovascular: Normal rate, regular rhythm, normal heart sounds and intact distal pulses.  Exam reveals no gallop and no friction rub.    No murmur heard.  Pulmonary/Chest: Effort normal and breath sounds normal. No respiratory distress. He exhibits no tenderness.   Abdominal: Soft. Bowel sounds are normal. He exhibits no distension. There is no tenderness.   Musculoskeletal: Normal range of motion. He exhibits no edema, tenderness or deformity.   Neurological: He is alert and oriented to person, place, and time. No cranial nerve deficit. Coordination normal.   Skin: Skin is warm and dry. No rash noted. He is not diaphoretic. No erythema. No pallor.   Psychiatric: He has a normal mood and affect. His behavior is normal. Judgment and thought content normal.   Nursing note and vitals reviewed.      Significant Labs:   Recent Lab Results       10/17/17  2303      Alcohol, Urine <10     Benzodiazepines Negative     Methadone metabolites  Negative     Phencyclidine Negative     Amphetamine Screen, Ur Negative     aPTT 23.7  Comment:  aPTT therapeutic range = 39-69 seconds     Barbiturate Screen, Ur Negative     Cocaine (Metab.) Negative     Creatinine, Random Ur 104.0  Comment:  The random urine reference ranges provided were established   for 24 hour urine collections.  No reference ranges exist for  random urine specimens.  Correlate clinically.       Group & Rh O POS     INDIRECT ELSA NEG     Coumadin Monitoring INR 1.0  Comment:  Coumadin Therapy:  2.0 - 3.0 for INR for all indicators except mechanical heart valves  and antiphospholipid syndromes which should use 2.5 - 3.5.       Opiate Scrn, Ur Negative     Protime 11.1     Marijuana (THC) Metabolite Negative     Toxicology Information SEE COMMENT  Comment:  This screen includes the following classes of drugs at the   listed cut-off:  Benzodiazepines                  200 ng/ml  Methadone                        300 ng/ml  Cocaine metabolite               300 ng/ml  Opiates                          300 ng/ml  Barbiturates                     200 ng/ml  Amphetamines                    1000 ng/ml  Marijuana metabs (THC)            50 ng/ml  Phencyclidine (PCP)               25 ng/ml  High concentrations of Diphenhydramine may cross-react with  Phencyclidine PCP screening immunoassay giving a false   positive result.  High concentrations of Methylenedioxymethamphetamine (MDMA aka  Ectasy) and other structurally similar compounds may cross-   react with the Amphetamine/Methamphetamine screening   immunoassay giving a false positive result.  A metabolite of the anti-HIV drug Sustiva () may cause  false positive results in the Marijuana metabolite (THC)   screening assay.  Note: This exception list includes only more common   interferants in toxicology screen testing.  Because of many   cross-reactantspositive results on toxicology drug screens   should be confirmed whenever results do not  correlate with   clinical presentation.  This report is intended for use in clinical monitoring and  management of patients. It is not intended for use in   employment related drug testing.  Because of any cross-reactants, positive results on toxicology  drug screens should be confirmed whenever results do not  correlate with clinical presentation.  Presumptive positive results are unconfirmed and may be used   only for medical purposes.             Significant Imaging: EKG: I have reviewed all pertinent results/findings within the past 24 hours and my personal findings are: sinus bradycardia, 1st degree AV block  I have reviewed all pertinent imaging results/findings within the past 24 hours.    Assessment/Plan:     Heart palpitations    Assessment  57M with reported history of SVT, PVCs on acebutolol presents with ICH and reported heart palpitations. No tachyarrhythmias captured on EKGs during this admission. Patient has not been continuously monitored. History reveals no exogenous cause for palpitations. Serum studies thus far not suggestive of endocrine abnormalities.     Plan  - Start cardiac telemetry monitoring while inpatient  - Continue home acebutolol  - Perform 2D Echo  - Obtain TSH level  - Discharge with auto-trigger cardiac event monitor for 2 weeks to be followed up by patient's Cardiologist at [facility] in [place]            VTE Risk Mitigation         Ordered     Medium Risk of VTE  Once      10/17/17 0313              Thank you for your consult. I will follow-up with patient. Please contact us if you have any additional questions.    Jigna Weems MD  Department of Hospital Medicine   [facility]    I have reviewed and concur with the resident's history, physical, assessment, and plan.  I have personally interviewed and examined the patient at bedside.  I agree with the management plan as stated in Dr. Weems's note. If patient still admitted will follow up tomorrow.  Monica Bernard  MD Marco Antonio  [facility] Medicine Staff

## 2017-10-18 NOTE — HPI
"57M with reported history of SVT and PCVs on acebutolol transferred from [facility] for further evaluation of ICH by Neurosurgery. Hospital medicine consulted for "new onset palpitations, PVC".    The patient was in his usual state of health yesterday morning. 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 He traveled to [facility]. On arrival, he was found to have /91, sinus bradycardia on ECG with 1st degree AV block, and no detectable troponins. CT/MRI head revealed 7 mm parenchymal hemorrhage within the right parietal lobe for which he was transferred to [facility] for further evaluation.    The patient is well established with [facility] [place] [facility] in [place] where he has been followed for 17yrs. He has a prior history of SVT and PVCs for which he is treated with acebutolol. He is fully compliant. He reports infrequent mild sensations of heart palpitations which occur about once per month and diminished in intensity and frequency since starting beta blockade therapy. He denies any prior episodes like the present one, prior issues with thyroid disease or other endocrine dysfunction. Denies recent medication changes, recreational drug use, or abnormal events.  "

## 2017-10-18 NOTE — SUBJECTIVE & OBJECTIVE
Past Medical History:   Diagnosis Date    PVC (premature ventricular contraction)      Past Surgical History:   Procedure Laterality Date    APPENDECTOMY      CHOLECYSTECTOMY       History reviewed. No pertinent family history.  Social History   Substance Use Topics    Smoking status: Never Smoker    Smokeless tobacco: Never Used    Alcohol use No     Review of patient's allergies indicates:   Allergen Reactions    Codeine Hallucinations    Iodine and iodide containing products Itching     Medications: I have reviewed the current medication administration record.    Prescriptions Prior to Admission   Medication Sig Dispense Refill Last Dose    acebutolol (SECTRAL) 200 MG capsule Take 200 mg by mouth 2 (two) times daily.   10/17/2017    fluoxetine (PROZAC) 20 MG capsule Take 20 mg by mouth once daily.   10/17/2017    lansoprazole (PREVACID) 30 MG capsule Take 30 mg by mouth once daily.   10/17/2017       Review of Systems   Constitutional: Positive for diaphoresis. Negative for chills and fever.   HENT: Negative for drooling and trouble swallowing.    Eyes: Positive for photophobia. Negative for pain, redness and visual disturbance.   Respiratory: Negative for cough and shortness of breath.    Cardiovascular: Positive for palpitations. Negative for chest pain.   Gastrointestinal: Negative for abdominal pain, nausea and vomiting.   Endocrine: Negative for cold intolerance, heat intolerance and polydipsia.   Genitourinary: Negative for dysuria and hematuria.   Musculoskeletal: Negative for neck pain and neck stiffness.   Skin: Negative for rash.   Allergic/Immunologic: Negative for environmental allergies and food allergies.   Neurological: Positive for light-headedness and headaches. Negative for dizziness, facial asymmetry, speech difficulty and weakness.   Hematological: Negative for adenopathy. Does not bruise/bleed easily.   Psychiatric/Behavioral: Negative for confusion.     Objective:     Vital  Signs (Most Recent):  Temp: 98.2 °F (36.8 °C) (10/17/17 2113)  Pulse: (!) 54 (10/18/17 0102)  Resp: 19 (10/18/17 0112)  BP: 129/61 (10/18/17 0003)  SpO2: 97 % (10/18/17 0001)    Vital Signs Range (Last 24H):  Temp:  [97.4 °F (36.3 °C)-98.2 °F (36.8 °C)]   Pulse:  [50-63]   Resp:  [16-20]   BP: (129-186)/(61-91)   SpO2:  [97 %-100 %]     Physical Exam   Constitutional: He is oriented to person, place, and time. He appears well-developed and well-nourished.   HENT:   Head: Normocephalic and atraumatic.   Right Ear: External ear normal.   Left Ear: External ear normal.   Nose: Nose normal.   Mouth/Throat: Oropharynx is clear and moist.   Eyes: Conjunctivae and EOM are normal. Pupils are equal, round, and reactive to light. Right eye exhibits no discharge. Left eye exhibits no discharge. No scleral icterus.   Neck: Normal range of motion. Neck supple. No thyromegaly present.   Cardiovascular: Regular rhythm.  Bradycardia present.    Pulmonary/Chest: Effort normal.   Abdominal: He exhibits no distension. There is no tenderness.   Musculoskeletal: Normal range of motion. He exhibits no edema.   Lymphadenopathy:     He has no cervical adenopathy.   Neurological: He is alert and oriented to person, place, and time. GCS eye subscore is 4 - spontaneous. GCS verbal subscore is 5 - oriented. GCS motor subscore is 6 - obeys commands.   Skin: Skin is warm and dry. He is not diaphoretic.   Nursing note and vitals reviewed.      Neurological Exam:   LOC: alert and follows requests  Language: No aphasia  Speech: No dysarthria  Orientation: Person, Place, Time  Visual Fields (CN II): Full  EOM (CN III, IV, VI): Full/intact  Pupils (CN III, IV, VI): PERRL  Facial Movement (CN VII): symmetric facial expression  Motor*: Arm Left:  Normal (5/5), Leg Left:   Normal (5/5), Arm Right:   Normal (5/5), Leg Right:   Normal (5/5)  Cerebellar*: no dysmetria  Sensation: intact to light touch, temperature and vibration    NIH Stroke  Scale:  Interval: baseline (upon arrival/admit)  Level of Consciousness: 0 - alert  LOC Questions: 0 - answers both correctly  LOC Commands: 0 - performs both correctly  Best Gaze: 0 - normal  Visual: 0 - no visual loss  Facial Palsy: 0 - normal  Motor Left Arm: 0 - no drift  Motor Right Arm: 0 - no drift  Motor Left Le - no drift  Motor Right Le - no drift  Limb Ataxia: 0 - absent  Sensory: 0 - normal  Best Language: 0 - no aphasia  Dysarthria: 0 - normal articulation  Extinction and Inattention: 0 - no neglect  NIH Stroke Scale Total: 0  [place] Coma Scale:  Best Eye Response: 4 - spontaneous  Best Motor Response: 6 - obeys commands  Best Verbal Response: 5 - oriented  [place] Coma Scale Total: 15  Modified [place] Scale:   Timeline: Prior to symptoms onset  Modified [place] Score: 0 - no symptoms    ICH Scale:   [place] Coma Score: 0 - 13-15  Age > or = 80: 0 - no  ICH Volume > or = 30 mL: 0 - no  Intraventricular Hemorrhage: 0 - no  Infratentorial Origin of Hemorrhage: 0 - no  ICH Scale Total: 0      Laboratory:  CMP:   Recent Labs  Lab 10/17/17  1343   CALCIUM 10.0      K 4.7   CO2 30*      BUN 12   CREATININE 1.00     CBC:   Recent Labs  Lab 10/17/17  1343   WBC 7.90   RBC 4.59*   HGB 14.0   HCT 41.4      MCV 90   MCH 30.4   MCHC 33.8     Lipid Panel: No results for input(s): CHOL, LDLCALC, HDL, TRIG in the last 168 hours.  Coagulation:   Recent Labs  Lab 10/17/17  2303   INR 1.0   APTT 23.7     Hgb A1C: No results for input(s): HGBA1C in the last 168 hours.  TSH: No results for input(s): TSH in the last 168 hours.    Diagnostic Results:  Brain Imaging: CT Head. Date: 10/18/17  Acute Pathology: ICH  Location: Parietal:  right  Old Vascular Pathology: None  Location: N/A    Cerebrovascular Imaging: pending    Cervical Vascular Imaging: pending    Cardiac Evaluation: pending  EKG/Telemetry: Sinus rhythm, bradycardia

## 2017-10-18 NOTE — HPI
58 yo male with pmh of SVT presents as transfer for spontaneous R parietal ICH.     Pt says he felt a strange feeling in his chest and then developed a headache. He continues to have a moderate headache. No nuchal rigidty or phtophobia.     Pt is neurologically intact on exam.     Outside head CT shows small region of hyperdensity in R parietal lobe. MRI from OSH show interval stability with no evidence of mass.     Neurosurgery is consulted for ICH.     ICHS 0.

## 2017-10-18 NOTE — ASSESSMENT & PLAN NOTE
56 yo male with non traumatic small right parietal ICH with unclear etiology, possibly embolic   - Pt is neurologically intact, some c/o HAs today  - MRI Brain does not show underlying mass or vascular abnormality.    - CTA Head is pending  - h/o PVCs, palpitations.  EKG is wnl.  2D echo pending. Medicine consulted.   - Vascular neurology following, appreciate recommendations   - PT/OT   - Discussed with Dr. Rivero

## 2017-10-18 NOTE — HOSPITAL COURSE
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 Echo reported today. Complains of headache and photophobia only. Denies weakness or numbness. Alert and oriented.

## 2017-10-18 NOTE — HPI
"Patient is a 57 y.o. male with significant past medical history of PVC presented to hospital as a transfer from [facility] for evaluation of ICH.  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  He also felt light headed.  Nothing preciptated or alleviated his symptoms.  On arrival to the [facility] ED the patient reported acute onset HA.  The patient denies N/V, fever, chills, or CP.  His initial BP was 180s/90s.  A CTH was obtained and revealed an IPH.  The patient was transferred to [facility] for further evaluation, close monitoring.  On arrival to the ED the patient remains w/complaint of HA, mild photophobia.  He denies N/V, weakness, numbness.  The patient endorses intermittent chest discomfort described as "fluttering".  The patient admitted by NSGY for further evaluation of ICH.                        "

## 2017-10-18 NOTE — PROGRESS NOTES
[facility]  Neurosurgery  Progress Note    Subjective:     History of Present Illness: 56 yo male with pmh of SVT presents as transfer for spontaneous R parietal ICH.     Pt says he felt a strange feeling in his chest and then developed a headache. He continues to have a moderate headache. No nuchal rigidty or phtophobia.     Pt is neurologically intact on exam.     Outside head CT shows small region of hyperdensity in R parietal lobe. MRI from OSH show interval stability with no evidence of mass.     Neurosurgery is consulted for ICH.     ICHS 0.    Post-Op Info:  * No surgery found *         Interval History:  NAEON.  Pt states mild HAs. Continued intermittent c/o palpitations.  Denies N/V, visual changes, weakness, or seizures.        Medications:  Continuous Infusions:   sodium chloride 0.9% 100 mL/hr at 10/18/17 0043     Scheduled Meds:   acebutolol  200 mg Oral BID    fluoxetine  20 mg Oral Daily    pantoprazole  40 mg Oral Daily     PRN Meds:dextrose 50%, dextrose 50%, glucagon (human recombinant), glucose, glucose, glucose     Review of Systems  Objective:     Weight: 69.4 kg (153 lb)  Body mass index is 27.1 kg/m².  Vital Signs (Most Recent):  Temp: 97.8 °F (36.6 °C) (10/18/17 1710)  Pulse: 65 (10/18/17 1710)  Resp: 16 (10/18/17 1710)  BP: (!) 141/70 (10/18/17 1710)  SpO2: (!) 93 % (10/18/17 1710) Vital Signs (24h Range):  Temp:  [97.4 °F (36.3 °C)-98.2 °F (36.8 °C)] 97.8 °F (36.6 °C)  Pulse:  [51-65] 65  Resp:  [16-19] 16  SpO2:  [92 %-100 %] 93 %  BP: (126-167)/(61-88) 141/70                    Neurosurgery Physical Exam   General: no distress  Neurologic: Alert and oriented. Thought content appropriate.  Head: normocephalic  GCS: Motor: 6/Verbal: 5/Eyes: 4 GCS Total: 15  Cranial nerves: face symmetric, tongue midline, pupils equal, round, reactive to light with accomodation, extraocular muscles intact  Sensory: response to light touch throughout  Motor Strength:full strength upper  and lower extremities  Pronator Drift: no drift noted  Finger to nose normal  No focal numbness or weakness  Lungs:  normal respiratory effort  Abdomen: soft, non-tender   Extremities: no cyanosis or edema, or clubbing      Significant Labs:    Recent Labs  Lab 10/17/17  1343 10/18/17  1354   GLU 91 106    139   K 4.7 4.1    104   CO2 30* 25   BUN 12 15   CREATININE 1.00 0.9   CALCIUM 10.0 8.9       Recent Labs  Lab 10/17/17  1343   WBC 7.90   HGB 14.0   HCT 41.4          Recent Labs  Lab 10/17/17  2303   INR 1.0   APTT 23.7     Microbiology Results (last 7 days)     ** No results found for the last 168 hours. **          Significant Diagnostics: personally reviewed  MRI: Mri Brain W Wo Contrast    Result Date: 10/17/2017  7 mm parenchymal hemorrhage within the right parietal lobe. Electronically signed by: CHARLES VAUGHN MD Date:     10/17/17 Time:    18:45     Assessment/Plan:     ICH (intracerebral hemorrhage)    56 yo male with non traumatic small right parietal ICH with unclear etiology, possibly embolic   - Pt is neurologically intact, some c/o HAs today  - MRI Brain does not show underlying mass or vascular abnormality.    - CTA Head is pending  - h/o PVCs, palpitations.  EKG is wnl.  2D echo pending. Medicine consulted.   - Vascular neurology following, appreciate recommendations   - PT/OT   - Discussed with STORM Samuels  Neurosurgery  [facility]

## 2017-10-19 ENCOUNTER — CLINICAL SUPPORT (OUTPATIENT)
Dept: ELECTROPHYSIOLOGY | Facility: CLINIC | Age: 57
End: 2017-10-19
Payer: COMMERCIAL

## 2017-10-19 VITALS
DIASTOLIC BLOOD PRESSURE: 71 MMHG | TEMPERATURE: 98 F | OXYGEN SATURATION: 96 % | WEIGHT: 153 LBS | SYSTOLIC BLOOD PRESSURE: 140 MMHG | RESPIRATION RATE: 17 BRPM | HEIGHT: 63 IN | BODY MASS INDEX: 27.11 KG/M2 | HEART RATE: 59 BPM

## 2017-10-19 DIAGNOSIS — I49.3 PVC (PREMATURE VENTRICULAR CONTRACTION): ICD-10-CM

## 2017-10-19 DIAGNOSIS — R00.2 HEART PALPITATIONS: Primary | ICD-10-CM

## 2017-10-19 PROCEDURE — 92610 EVALUATE SWALLOWING FUNCTION: CPT

## 2017-10-19 PROCEDURE — G8996 SWALLOW CURRENT STATUS: HCPCS | Mod: CH

## 2017-10-19 PROCEDURE — 92523 SPEECH SOUND LANG COMPREHEN: CPT

## 2017-10-19 PROCEDURE — 93270 REMOTE 30 DAY ECG REV/REPORT: CPT | Mod: S$GLB,,, | Performed by: INTERNAL MEDICINE

## 2017-10-19 PROCEDURE — 97161 PT EVAL LOW COMPLEX 20 MIN: CPT

## 2017-10-19 PROCEDURE — G8998 SWALLOW D/C STATUS: HCPCS | Mod: CH

## 2017-10-19 PROCEDURE — 93271 ECG/MONITORING AND ANALYSIS: CPT | Mod: S$GLB,,, | Performed by: INTERNAL MEDICINE

## 2017-10-19 PROCEDURE — G8997 SWALLOW GOAL STATUS: HCPCS | Mod: CH

## 2017-10-19 PROCEDURE — 25000003 PHARM REV CODE 250: Performed by: STUDENT IN AN ORGANIZED HEALTH CARE EDUCATION/TRAINING PROGRAM

## 2017-10-19 RX ADMIN — PANTOPRAZOLE SODIUM 40 MG: 40 TABLET, DELAYED RELEASE ORAL at 08:10

## 2017-10-19 RX ADMIN — ACEBUTOLOL HYDROCHLORIDE 200 MG: 200 CAPSULE ORAL at 08:10

## 2017-10-19 RX ADMIN — FLUOXETINE 20 MG: 20 CAPSULE ORAL at 08:10

## 2017-10-19 NOTE — PT/OT/SLP EVAL
Physical Therapy  Evaluation/Discharge    Rubio Rodriguez   MRN: [id number]   Admitting Diagnosis: R Parietal ICH    PT Received On: 10/19/17  PT Start Time: 0757     PT Stop Time: 0809    PT Total Time (min): 12 min       Billable Minutes:  Evaluation 12    Diagnosis: R Parietal ICH    Level of complexity: Low due to stable clinical presentation    Past Medical History:   Diagnosis Date    PVC (premature ventricular contraction)       Past Surgical History:   Procedure Laterality Date    APPENDECTOMY      CHOLECYSTECTOMY         Referring physician: HANNA Rivero  Date referred to PT: 10/18/2017    General Precautions: Standard, fall  Orthopedic Precautions: N/A   Braces: N/A            Patient History:  Lives With: spouse  Living Arrangements: house  Home Accessibility: stairs to enter home  Home Layout: Able to live on 1st floor  Number of Stairs to Enter Home: 5  Stair Railings at Home: outside, present on right side  Transportation Available: family or friend will provide  DME owned (not currently used): none    Previous Level of Function:  Ambulation Skills: independent  Transfer Skills: independent  ADL Skills: independent  Work/Leisure Activity: independent    Subjective:  Communicated with RN prior to session.    Pt presents supine with HOB elevated and wife present. He states that he still has a headache that is unchanged since yesterday. He states that he feels strong and able to take care of himself. He states that he lives with his wife who works, but that his son is available to assist if needed. 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 He states that he is agreeable to PT evaluation today.      Chief Complaint: HA  Patient goals: Return to home/work    Pain/Comfort  Pain Rating 1: 3/10  Location - Side 1: Right  Location - Orientation 1: generalized  Location 1: head  Pain Addressed 1: Distraction  Pain Rating Post-Intervention 1: 3/10      Objective:          Cognitive Exam:  Oriented to: Person, Place, Time and Situation    Follows Commands/attention: Follows multistep  commands  Communication: clear/fluent  Safety awareness/insight to disability: intact    Physical Exam:  Postural examination/scapula alignment: Rounded shoulder and Head forward    Skin integrity: Visible skin intact  Edema: None noted     Sensation:   Intact    Upper Extremity Range of Motion:  Right Upper Extremity: WFL  Left Upper Extremity: WFL    Upper Extremity Strength:  Right Upper Extremity: WFL  Left Upper Extremity: WFL    Lower Extremity Range of Motion:  Right Lower Extremity: WFL  Left Lower Extremity: WFL    Lower Extremity Strength:  Right Lower Extremity: WFL  Left Lower Extremity: WFL     Fine motor coordination:  Intact    Gross motor coordination: WFL    Functional Mobility:  Bed Mobility:  Scooting/Bridging: Independent  Supine to Sit: Independent  Sit to Supine: Independent    Transfers:  Sit <> Stand Assistance: Independent  Sit <> Stand Assistive Device: No Assistive Device    Gait:   Gait Distance: 300 ft  Assistance 1: Independent  Gait Assistive Device: No device    Stairs:  Pt ascended/descend 5 stair(s) with No Assistive Device with no and handrails with Independent.     Balance:   Static Sit: NORMAL: No deviations seen in posture held statically  Dynamic Sit: NORMAL: No deviations seen in posture held dynamically  Static Stand: GOOD+: Takes MAXIMAL challenges from all directions  Dynamic stand: GOOD+: Independent gait (with or without assistive device)    Therapeutic Activities and Exercises:  Pt educated on:   Role of PT  Being aware of changes in HA  Safe to walk in hallway with wife      AM-PAC 6 CLICK MOBILITY  How much help from another person does this patient currently need?   1 = Unable, Total/Dependent Assistance  2 = A lot, Maximum/Moderate Assistance  3 = A little, Minimum/Contact Guard/Supervision  4 = None, Modified [place]/Independent    Turning over in  bed (including adjusting bedclothes, sheets and blankets)?: 4  Sitting down on and standing up from a chair with arms (e.g., wheelchair, bedside commode, etc.): 4  Moving from lying on back to sitting on the side of the bed?: 4  Moving to and from a bed to a chair (including a wheelchair)?: 4  Need to walk in hospital room?: 4  Climbing 3-5 steps with a railing?: 4  Total Score: 24     AM-PAC Raw Score CMS G-Code Modifier Level of Impairment Assistance   6 % Total / Unable   7 - 9 CM 80 - 100% Maximal Assist   10 - 14 CL 60 - 80% Moderate Assist   15 - 19 CK 40 - 60% Moderate Assist   20 - 22 CJ 20 - 40% Minimal Assist   23 CI 1-20% SBA / CGA   24 CH 0% Independent/ Mod I     Patient left HOB elevated with call button in reach, RN notified and spouse present.    Assessment:   Rubio Rodriguez is a 57 y.o. male with a medical diagnosis of R parietal ICH and presents with HA. Pt shows gross UE/LE strength WFL and independence with bed mobility, transfers, ambulation, and stair negotiation. He is safe to discharge home from a mobility standpoint at this time.    Rehab potential is excellent.    Activity tolerance: Excellent    Discharge recommendations: Discharge Facility/Level Of Care Needs: home     Barriers to discharge: Barriers to Discharge: None    Equipment recommendations: Equipment Needed After Discharge: none     GOALS:    Physical Therapy Goals     Not on file          Multidisciplinary Problems (Resolved)        Problem: Physical Therapy Goal    Goal Priority Disciplines Outcome Goal Variances Interventions   Physical Therapy Goal   (Resolved)     PT/OT, PT Outcome(s) achieved     Description:                         PLAN:    D/C PT          Milan Rangel, SPT  10/19/2017

## 2017-10-19 NOTE — PLAN OF CARE
Problem: SLP Goal  Goal: SLP Goal  Short Term Goals:   1. Pt will participate in a bedside swallow study to determine the safest and least restrictive possible po diet with possible updated goals to follow pending results. -MET  2. Pt will participate in a speech, language, and cognitive evaluation with possible updated goals to follow pending results.-MET  Outcome: Ongoing (interventions implemented as appropriate)  BSS and [facility] Evaluation completed. Pt presented with no overt s/s of aspiration with all consistencies trialed and no speech, language or cognitive deficits. Family and pt report pt is at baseline for speech, language, cognition, and swallowing. No further skilled acute ST services warranted at this time. Please re-consult as needed.   JEFF Morgan., CCC-SLP  Pager: [phone]  10/19/2017

## 2017-10-19 NOTE — PLAN OF CARE
SW following for DC needs. SW in communication with CM.    Reyna Tobar, [facility]  [phone]

## 2017-10-19 NOTE — PLAN OF CARE
57M with reported history of SVT, PVCs on acebutolol presents with R parietal hemorrhage and reported heart palpitations.    NAEON. Telemetry reviewed. No arrhythmias, PVCs, PACs, or other abnormalities. TSH WNL. Urine drug and toxicology screen negative.     Stable for discharge from medical perspective pending completion of work up with TTE. Recommend cardiac event monitoring as outpatient with follow up in [facility].    Hospital Medicine signing off please call/page with questions.    Jigna Weems MD  Hospital Medicine Consults  [phone]

## 2017-10-19 NOTE — PROGRESS NOTES
[facility]  Vascular Neurology  Comprehensive Stroke Center  Progress Note    Assessment/Plan:     10/18/17 - patient with CTA showing likely cavernoma. Echo reported today. Complains of headache and photophobia only. Denies weakness or numbness. Alert and oriented.     PVC (premature ventricular contraction)    -Stroke risk factor. 2D Echo done  30 day event monitor to be ordered on discharge   Referral to [facility]         ICH (intracerebral hemorrhage)    57 y.o. male with significant past medical history of PVC presented to hospital as a transfer from [facility] for evaluation of ICH.   Likely cavernoma     -Antithrombotics - not indicated - cavernoma   -Statins- not indicated for cavernoma  -Aggressive risk factor modification: PVCs - needs 30 day event monitor   -Rehab Efforts: None: Reason: No deficits  -Diagnostics: none   -VTE Prophylaxis: patient ambulatory, dx with cavernoma -Mechanical prophylaxis: Place SCDs/early ambulation   -SBP<160  - PT/OT/speech                                       Neurologic Chief Complaint: IPH vs cavernoma     Subjective:     Interval History: Patient is seen for follow-up neurological assessment and treatment recommendations:  patient with CTA showing likely cavernoma. Echo reported today. Complains of headache and photophobia only. Denies weakness or numbness. Alert and oriented.     HPI, Past Medical, Family, and Social History remains the same as documented in the initial encounter.     Review of Systems   Constitutional: Negative for fever.   Eyes: Positive for photophobia.   Neurological: Positive for headaches. Negative for weakness and numbness.   Psychiatric/Behavioral: Negative for agitation and behavioral problems.     Scheduled Meds:   acebutolol  200 mg Oral BID    fluoxetine  20 mg Oral Daily    pantoprazole  40 mg Oral Daily     Continuous Infusions:   sodium chloride 0.9% 100 mL/hr at 10/18/17 0043     PRN  Meds:dextrose 50%, dextrose 50%, glucagon (human recombinant), glucose, glucose, glucose    Objective:     Vital Signs (Most Recent):  Temp: 97.8 °F (36.6 °C) (10/18/17 1710)  Pulse: 65 (10/18/17 1710)  Resp: 16 (10/18/17 1710)  BP: (!) 141/70 (10/18/17 1710)  SpO2: (!) 93 % (10/18/17 1710)  BP Location: Right arm    Vital Signs Range (Last 24H):  Temp:  [97.5 °F (36.4 °C)-98.2 °F (36.8 °C)]   Pulse:  [52-65]   Resp:  [16-19]   BP: (126-141)/(61-78)   SpO2:  [92 %-100 %]   BP Location: Right arm    Physical Exam   Constitutional: He is oriented to person, place, and time. He appears well-developed and well-nourished.   Cardiovascular: Normal rate.    Musculoskeletal: Normal range of motion.   Neurological: He is alert and oriented to person, place, and time.   Skin: Skin is warm and dry.   Nursing note and vitals reviewed.      Neurological Exam:   LOC: alert and follows requests  Language: No aphasia  Speech: No dysarthria  EOM (CN III, IV, VI): Full/intact  Motor*: Arm Left:  Normal (5/5), Leg Left:   Normal (5/5), Arm Right:   Normal (5/5), Leg Right:   Normal (5/5)  Sensation: intact to light touch, temperature and vibration  Tone: Arm-Left: normal; Leg-Left: normal; Arm-Right: normal; Leg-Right: normal    NIH Stroke Scale:    Level of Consciousness: 0 - alert  LOC Questions: 0 - answers both correctly  LOC Commands: 0 - performs both correctly  Best Gaze: 0 - normal  Visual: 0 - no visual loss  Facial Palsy: 0 - normal  Motor Left Arm: 0 - no drift  Motor Right Arm: 0 - no drift  Motor Left Le - no drift  Motor Right Le - no drift  Limb Ataxia: 0 - absent  Sensory: 0 - normal  Best Language: 0 - no aphasia  Dysarthria: 0 - normal articulation  Extinction and Inattention: 0 - no neglect  NIH Stroke Scale Total: 0      Laboratory:  CMP:   Recent Labs  Lab 10/18/17  1354   CALCIUM 8.9      K 4.1   CO2 25      BUN 15   CREATININE 0.9     CBC:   Recent Labs  Lab 10/17/17  1343   WBC 7.90   RBC  4.59*   HGB 14.0   HCT 41.4      MCV 90   MCH 30.4   MCHC 33.8     Lipid Panel: No results for input(s): CHOL, LDLCALC, HDL, TRIG in the last 168 hours.  Coagulation:   Recent Labs  Lab 10/17/17  2303   INR 1.0   APTT 23.7     Platelet Aggregation Study: No results for input(s): PLTAGG, PLTAGINTERP, PLTAGREGLACO, ADPPLTAGGREG in the last 168 hours.  Hgb A1C: No results for input(s): HGBA1C in the last 168 hours.  TSH:   Recent Labs  Lab 10/18/17  1354   TSH 1.104       Diagnostic Results:  I have personally reviewed:   CT head 10/17/17  7 mm density within the right parietal cortex this could represent a small hemorrhage or parenchymal calcification.  No significant mass effect.  Followup MRI with contrast is suggested.    MRI brain 10/17/17  7 mm parenchymal hemorrhage within the right parietal lobe.    CTA head and neck 10/18/17   Stable Subcentimeter cavernoma right parietal lobe corresponds to abnormality seen on prior MRI and CT. No evidence for new or recent hemorrhage.. Otherwise unremarkable CT head as detailed above.    CTA head: Incidental small left posterior communicating artery infundibulum. No evidence for intracranial aneurysm or focal proximal stenosis.    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      Subcentimeter hyperdensity subcutaneous soft tissues overlying the mid left sternocleidomastoid suggestive for embedded metallic foreign body clinical correlation advised    Echo 10/18/17  LA normal   CONCLUSIONS     1 - Normal left ventricular systolic function (EF 60-65%).     2 - Concentric remodeling.     3 - No wall motion abnormalities.     4 - Normal left ventricular diastolic function.     5 - Normal right ventricular systolic function .     6 - Trivial mitral regurgitation.     7 - Trivial to mild tricuspid regurgitation.     8 - The estimated PA systolic pressure is greater than 22 mmHg.       Leonela Zuniga,  PA  Comprehensive Stroke Center  Department of Vascular Neurology   [facility]

## 2017-10-19 NOTE — ASSESSMENT & PLAN NOTE
58 yo male with possible small right parietal ICH with possible underlying cavernoma.     - Pt is neurologically intact.  - MRI Brain does not show underlying mass or vascular abnormality.    - CTA Head shows possible cavernoma.  - h/o PVCs, palpitations.  EKG is wnl.  2D echo wnl. Medicine consulted, recommend discharge with holter monitor.  - Vascular neurology following, appreciate recommendations.  - Will discharge home today. FOllow up in 2 weeks with cardiologist and in 4 weeks with Dr. Rivero with repeat MRi brain with contrast.

## 2017-10-19 NOTE — PLAN OF CARE
Patient to be discharged home. The patient will have a 30 day Holter Event Monitor provided to [facility] by [facility]. [facility] will provide the patient with the specific instructions on the monitor and getting the recordings to get to his Cardiology practice in [place].    Neurosurgery to schedule follow up appointment.    Future Appointments  Date Time Provider Department Center   10/19/2017 1:00 PM MONITOR, ARRHYTHMIA EVENT [facility] [address]        10/19/17 9452   Final Note   Assessment Type Final Discharge Note   Discharge Disposition Home   Hospital Follow Up  Appt(s) scheduled? (Neurosurgery clinic will schedule follow up.)   Discharge plans and expectations educations in teach back method with documentation complete? Yes

## 2017-10-19 NOTE — DISCHARGE INSTRUCTIONS
- Continue to wear the cardiac monitor at all times. Follow up with your cardiologist in two weeks for reevaluation.  - Follow up with neurosurgery in 4-6 weeks with an MRI of the brain. We will schedule this appointment for you.  - Contact or present to the emergency department with any new or worsening symptoms.        Neurosurgery clinic: [phone]

## 2017-10-19 NOTE — PLAN OF CARE
Problem: Patient Care Overview  Goal: Plan of Care Review  Outcome: Ongoing (interventions implemented as appropriate)  POC reviewed with patient and spouse at 2200. AAOx4. VS remained stable throughout the shift. Afebrile. Pt remained free of falls and injuries during the night. Safety precautions remained in place. No new neuro changes. No new skin impairments noted. No c/o nausea/vomiting. Pt c/o headache at beginning of shift, and PRN tylenol was ordered and given. CTA and Echo were completed yesterday AM. Bed locked and low, side rails up x2, with call light in reach and spouse to remain at bedside. Will continue to monitor.

## 2017-10-19 NOTE — PROGRESS NOTES
[facility]  Neurosurgery  Progress Note    Subjective:     History of Present Illness: 58 yo male with pmh of SVT presents as transfer for spontaneous R parietal ICH.     Pt says he felt a strange feeling in his chest and then developed a headache. He continues to have a moderate headache. No nuchal rigidty or phtophobia.     Pt is neurologically intact on exam.     Outside head CT shows small region of hyperdensity in R parietal lobe. MRI from OSH show interval stability with no evidence of mass.     Neurosurgery is consulted for ICH.     ICHS 0.    Post-Op Info:  * No surgery found *         Interval History:  NAEON.  CTA shows possible cavernoma.  has recommended discharge home with holter monitor with outpatient cardiologist follow up.      Medications:  Continuous Infusions:    Scheduled Meds:    PRN Meds:     Review of Systems    Objective:     Weight: 69.4 kg (153 lb)  Body mass index is 27.1 kg/m².  Vital Signs (Most Recent):  Temp: 98.3 °F (36.8 °C) (10/19/17 0728)  Pulse: (!) 59 (10/19/17 1140)  Resp: 17 (10/19/17 1140)  BP: (!) 140/71 (10/19/17 1140)  SpO2: 96 % (10/19/17 1140) Vital Signs (24h Range):  Temp:  [97.4 °F (36.3 °C)-98.3 °F (36.8 °C)] 98.3 °F (36.8 °C)  Pulse:  [57-87] 59  Resp:  [16-17] 17  SpO2:  [94 %-96 %] 96 %  BP: (102-140)/(57-71) 140/71                    Neurosurgery Physical Exam     General: no distress  Neurologic: Alert and oriented. Thought content appropriate.  Head: normocephalic  GCS: Motor: 6/Verbal: 5/Eyes: 4 GCS Total: 15  Cranial nerves: face symmetric, tongue midline, pupils equal, round, reactive to light with accomodation, extraocular muscles intact  Sensory: response to light touch throughout  Motor Strength:full strength upper and lower extremities  Pronator Drift: no drift noted  Finger to nose normal  No focal numbness or weakness  Lungs:  normal respiratory effort  Abdomen: soft, non-tender   Extremities: no cyanosis or edema, or  clubbing      Significant Labs:    Recent Labs  Lab 10/18/17  1354         K 4.1      CO2 25   BUN 15   CREATININE 0.9   CALCIUM 8.9     No results for input(s): WBC, HGB, HCT, PLT in the last 48 hours.    Recent Labs  Lab 10/17/17  2303   INR 1.0   APTT 23.7     Microbiology Results (last 7 days)     ** No results found for the last 168 hours. **          Significant Diagnostics: personally reviewed  MRI: Mri Brain W Wo Contrast    Result Date: 10/17/2017  7 mm parenchymal hemorrhage within the right parietal lobe.    CTA reviewed  Stable Subcentimeter cavernoma right parietal lobe corresponds to abnormality seen on prior MRI and CT. No evidence for new or recent hemorrhage.. Otherwise unremarkable CT head as detailed above.    Assessment/Plan:     * ICH (intracerebral hemorrhage)    58 yo male with possible small right parietal ICH with possible underlying cavernoma.     - Pt is neurologically intact.  - MRI Brain does not show underlying mass or vascular abnormality.    - CTA Head shows possible cavernoma.  - h/o PVCs, palpitations.  EKG is wnl.  2D echo wnl. Medicine consulted, recommend discharge with holter monitor.  - Vascular neurology following, appreciate recommendations.  - Will discharge home today. FOllow up in 2 weeks with cardiologist and in 4 weeks with Dr. Rivero with repeat MRi brain with contrast.            Velvet Saunders PA-C  Neurosurgery  [facility]

## 2017-10-19 NOTE — SUBJECTIVE & OBJECTIVE
Interval History:  NAEON.  CTA shows possible cavernoma.  has recommended discharge home with holter monitor with outpatient cardiologist follow up.      Medications:  Continuous Infusions:    Scheduled Meds:    PRN Meds:     Review of Systems    Objective:     Weight: 69.4 kg (153 lb)  Body mass index is 27.1 kg/m².  Vital Signs (Most Recent):  Temp: 98.3 °F (36.8 °C) (10/19/17 0728)  Pulse: (!) 59 (10/19/17 1140)  Resp: 17 (10/19/17 1140)  BP: (!) 140/71 (10/19/17 1140)  SpO2: 96 % (10/19/17 1140) Vital Signs (24h Range):  Temp:  [97.4 °F (36.3 °C)-98.3 °F (36.8 °C)] 98.3 °F (36.8 °C)  Pulse:  [57-87] 59  Resp:  [16-17] 17  SpO2:  [94 %-96 %] 96 %  BP: (102-140)/(57-71) 140/71                    Neurosurgery Physical Exam     General: no distress  Neurologic: Alert and oriented. Thought content appropriate.  Head: normocephalic  GCS: Motor: 6/Verbal: 5/Eyes: 4 GCS Total: 15  Cranial nerves: face symmetric, tongue midline, pupils equal, round, reactive to light with accomodation, extraocular muscles intact  Sensory: response to light touch throughout  Motor Strength:full strength upper and lower extremities  Pronator Drift: no drift noted  Finger to nose normal  No focal numbness or weakness  Lungs:  normal respiratory effort  Abdomen: soft, non-tender   Extremities: no cyanosis or edema, or clubbing      Significant Labs:    Recent Labs  Lab 10/18/17  1354         K 4.1      CO2 25   BUN 15   CREATININE 0.9   CALCIUM 8.9     No results for input(s): WBC, HGB, HCT, PLT in the last 48 hours.    Recent Labs  Lab 10/17/17  2303   INR 1.0   APTT 23.7     Microbiology Results (last 7 days)     ** No results found for the last 168 hours. **          Significant Diagnostics: personally reviewed  MRI: Mri Brain W Wo Contrast    Result Date: 10/17/2017  7 mm parenchymal hemorrhage within the right parietal lobe.    CTA reviewed  Stable Subcentimeter cavernoma right parietal lobe corresponds to  abnormality seen on prior MRI and CT. No evidence for new or recent hemorrhage.. Otherwise unremarkable CT head as detailed above.

## 2017-10-19 NOTE — ASSESSMENT & PLAN NOTE
57 y.o. male with significant past medical history of PVC presented to hospital as a transfer from [facility] for evaluation of ICH.   Likely cavernoma     -Antithrombotics - not indicated - cavernoma   -Statins- not indicated for cavernoma  -Aggressive risk factor modification: PVCs - needs 30 day event monitor   -Rehab Efforts: None: Reason: No deficits  -Diagnostics: none   -VTE Prophylaxis: patient ambulatory, dx with cavernoma -Mechanical prophylaxis: Place SCDs/early ambulation   -SBP<160  - PT/OT/speech

## 2017-10-19 NOTE — PT/OT/SLP EVAL
"Speech Language Pathology Evaluation  Bedside Swallow Study  Discharge    Rubio Rodriguez   MRN: [id number]   728/728 A    Admitting Diagnosis: <principal problem not specified>    Diet recommendations: Solid Diet Level: Regular  Liquid Diet Level: Thin universal aspiration precautions    SLP Treatment Date: 10/19/17  Speech Start Time: 0920     Speech Stop Time: 0940     Speech Total (min): 20 min       TREATMENT BILLABLE MINUTES:  Eval 10  and Eval Swallow and Oral Function 10    Diagnosis: <principal problem not specified>    H&P:  "History of Present Illness: 56 yo male with pmh of SVT presents as transfer for spontaneous R parietal ICH.  Pt says he felt a strange feeling in his chest and then developed a headache. He continues to have a moderate headache. No nuchal rigidty or phtophobia.   Pt is neurologically intact on exam.  Outside head CT shows small region of hyperdensity in R parietal lobe. MRI from OSH show interval stability with no evidence of mass.  Neurosurgery is consulted for ICH."    MRI: 7 mm parenchymal hemorrhage within the right parietal lobe.    CXR: No active disease.    Past Medical History:   Diagnosis Date    PVC (premature ventricular contraction)      Past Surgical History:   Procedure Laterality Date    APPENDECTOMY      CHOLECYSTECTOMY         Has the patient been evaluated by SLP for swallowing? : Yes  Keep patient NPO?: No   General Precautions: Standard, fall    Current Respiratory Status:  (room air)     Social Hx: Patient lives with wife    Prior diet: reg/thin.      Subjective:  Pt awake and cooperative with family present in room.   Patient goals: none stated.    Pain/Comfort  Pain Rating 1: 0/10    Objective:      Oral Musculature Evaluation  Oral Musculature: WFL  Dentition: present and adequate  Mucosal Quality: adequate  Mandibular Strength and Mobility: WFL  Oral Labial Strength and Mobility: WFL  Lingual Strength and Mobility: WFL  Velar Elevation: WFL  Buccal Strength " and Mobility: WFL  Volitional Cough: elicited  Volitional Swallow: timely  Voice Prior to PO Intake: clear     Cognitive Status:  COGNITIVE STATUS:  Behavioral Observations: alert and cooperative  Memory:  · Immediate: % accuracy with all number and word lists  · Short Term: WFL recalled 3/3 words s/p a 5 minute delay with no cues  · Long Term: % accuracy  Orientation: Oriented x4   Attention: WF  Problem Solving: % accuracy  Insight Awareness: pt with good insight   Sequencing: % accuracy with 4 word mental manipulation task; pt with accurate sequencing of functional evelts  Pragmatics: WNL      LANGUAGE:    Receptive Language:   Complex y/n Questions: % accuracy  Complex Directions: % accuracy    Expressive Language:  Naming: named 15 animals in 1 minute (norm 15-20) and [employer] teams with no cues  Automatic Speech: % accuracy  Sentence Completion: % accuracy  Responsive Speech: % accuracy  Repetition: % accuracy    Motor Speech: No noted Dysarthria, Apraxia of Speech, Ataxia    Voice: adequate volumate, rate, prosody, breath support    Augmentative Alternative Communication: no    Reading: Pt read menu with no difficulties    Visual-Spatial: WFL; no noted neglect    Bedside Swallow Eval:  Pt seen for a bedside swallow study. Pt presents with no overt s/s of aspiration, penetration, or dysphagia.   Assessed with:  THIN:- ice chip x1; self regulated sip of water via cup and straw x2  PUREE:- tsp bite of pudding x2  SOLID: Roly cracker bite x2     Pt on a regular diet and thin liquids. Pt and family report no difficulties with breakfast. Pt reported he ate slow this morning to be cautious so he would not choke.   Education provided on: SLP role, POC, d/c from ST services, s/s and risks of aspiration, and swallowing precautions. Pt and family verbalized understanding of all discussed.       Assessment:  Rubio Rodriguez is a 57 y.o. male with no overt  s/s of aspirations, dysphagia, speech, language, or cognitive deficits. No further skilled ST services warranted at this time. Please re-consult as needed.          Discharge recommendations: Discharge Facility/Level Of Care Needs: home     Goals:    SLP Goals        Problem: SLP Goal    Goal Priority Disciplines Outcome   SLP Goal     SLP Ongoing (interventions implemented as appropriate)   Description:  Short Term Goals:   1. Pt will participate in a bedside swallow study to determine the safest and least restrictive possible po diet with possible updated goals to follow pending results. -MET  2. Pt will participate in a speech, language, and cognitive evaluation with possible updated goals to follow pending results.-MET                     Plan:   Patient to be seen     Plan of Care expires:    Plan of Care reviewed with: patient, spouse  SLP Follow-up?: No         SLP G-Codes  Functional Assessment Tool Used: noms  Score: 7  Functional Limitations: Swallowing  Swallow Current Status ():   Swallow Goal Status ():   Swallow Discharge Status (): CR Church, CCC-SLP   Pager: [phone]  10/19/2017

## 2017-10-20 ENCOUNTER — TELEPHONE (OUTPATIENT)
Dept: NEUROSURGERY | Facility: HOSPITAL | Age: 57
End: 2017-10-20

## 2017-10-20 NOTE — TELEPHONE ENCOUNTER
"Attempted to contact patient via number on file.  No answer.  The following message was left for patient to return call "Hello.  This is a message for Rubio Rodriguez.  My name is Earl and I am a nurse at [facility].  If you could give me call back at [phone] between the hours of 08:00 AM and 04:00 PM, Monday through Friday.  Thanks so much and have a great day."  Will try again later.   "

## 2017-10-23 ENCOUNTER — TELEPHONE (OUTPATIENT)
Dept: NEUROSURGERY | Facility: HOSPITAL | Age: 57
End: 2017-10-23

## 2017-10-23 NOTE — TELEPHONE ENCOUNTER
"Attempted to contact patient via number on file.  No answer.  The following message was left for patient to return call "Hello.  This is a message for Rubio Rodriguez.  My name is Earl and I am a nurse at [facility].  If you could give me call back at [phone] between the hours of 08:00 AM and 04:00 PM, Monday through Friday.  Thanks so much and have a great day."  Will try again later.   "

## 2017-10-23 NOTE — DISCHARGE SUMMARY
[facility]  Neurosurgery  Discharge Summary      Patient Name: Rubio Rodriguez  MRN: [id number]  Admission Date: 10/17/2017  Hospital Length of Stay: 2 days  Discharge Date and Time: 10/19/2017  1:12 PM  Attending Physician: No att. providers found   Discharging Provider: Velvet Saunders PA-C  Primary Care Provider: Philip Hutton MD    HPI:   58 yo male with pmh of SVT presents as transfer for spontaneous R parietal ICH.     Pt says he felt a strange feeling in his chest and then developed a headache. He continues to have a moderate headache. No nuchal rigidty or phtophobia.     Pt is neurologically intact on exam.     Outside head CT shows small region of hyperdensity in R parietal lobe. MRI from OSH show interval stability with no evidence of mass.     Neurosurgery is consulted for ICH.     ICHS 0.    * No surgery found *     Hospital Course: 10/18: CTA Head Pending.  2D Echo pending.  Medicine consulted for h/o PVCs and c/o palpitations.  On telemetry.   10/19: CTA shows possible cavernoma.  has recommended discharge home with holter monitor with outpatient cardiologist follow up.    Consults:   Consults         Status Ordering Provider     Inpatient consult to Hospital Medicine-General  Once     Provider:  (Not yet assigned)    Completed TAL GIBBONS          Significant Diagnostic Studies: CBC, BMP, PT, INR, CT, CTA, MRI    Pending Diagnostic Studies:     None        Final Active Diagnoses:    Diagnosis Date Noted POA    PRINCIPAL PROBLEM:  ICH (intracerebral hemorrhage) [I61.9] 10/17/2017 Yes    PVC (premature ventricular contraction) [I49.3] 10/18/2017 Yes    Heart palpitations [R00.2] 10/18/2017 Yes      Problems Resolved During this Admission:    Diagnosis Date Noted Date Resolved POA      Discharged Condition: stable    Disposition: Home or Self Care    Follow Up: See the patient instruction tab for detailed discharge instructions and follow up information.    Patient  Instructions:   No discharge procedures on file.  Medications:  Reconciled Home Medications:   Discharge Medication List as of 10/19/2017 12:50 PM      CONTINUE these medications which have NOT CHANGED    Details   acebutolol (SECTRAL) 200 MG capsule Take 200 mg by mouth 2 (two) times daily., Historical Med      fluoxetine (PROZAC) 20 MG capsule Take 20 mg by mouth once daily., Historical Med      lansoprazole (PREVACID) 30 MG capsule Take 30 mg by mouth once daily., Historical Med             Velvet Saunders PA-C  Neurosurgery  [facility]

## 2017-10-24 ENCOUNTER — TELEPHONE (OUTPATIENT)
Dept: NEUROSURGERY | Facility: HOSPITAL | Age: 57
End: 2017-10-24

## 2017-10-24 NOTE — TELEPHONE ENCOUNTER
"Attempted to contact patient via number on file.  No answer.  The following message was left for patient to return call "Hello.  This is a message for Rubio Rodriguez.  My name is Earl and I am a nurse at [facility].  If you could give me call back at [phone] between the hours of 08:00 AM and 04:00 PM, Monday through Friday.  Thanks so much and have a great day."  Third unsuccessful attempt.   "

## 2017-12-11 ENCOUNTER — TELEPHONE (OUTPATIENT)
Dept: ELECTROPHYSIOLOGY | Facility: CLINIC | Age: 57
End: 2017-12-11

## 2017-12-11 NOTE — TELEPHONE ENCOUNTER
Patient's wife contacted the [facility] on this afternoon in relation to the results of the 30 day event monitor that the patient wore. Informed the wife the report is on the doctors desk to be read and as soon as it is read someone would call her back with the results.  As well as let her know the doctor is in procedures on today and it will be reviewed in between them and every effort will be made to have this done today.  Patient's wife verbalized understanding to all of the above.

## 2017-12-11 NOTE — TELEPHONE ENCOUNTER
Received message pt still waiting for event monitor results. Spoke to Dr. Ferrera and he reviewed event monitor. Called pt back to relay no arrhythmias noted; however, no answer. Left voicemail for pt to call back to clinic.

## 2017-12-11 NOTE — TELEPHONE ENCOUNTER
Ms Rodriguez called to inquire as to why her 's 30 day monitor was not resulted. I advised I would look into it and call her back.

## 2017-12-12 ENCOUNTER — TELEPHONE (OUTPATIENT)
Dept: ELECTROPHYSIOLOGY | Facility: CLINIC | Age: 57
End: 2017-12-12

## 2017-12-12 NOTE — TELEPHONE ENCOUNTER
"[facility]'s [facility] on this afternoon.  (see telephone note 12/11/17).  Results of 30 day event monitor was given to patient's wife, "No arrhythmias noted" as per MD.  Patient's wife verbalized understanding.   "

## 2017-12-13 PROCEDURE — 93268 ECG RECORD/REVIEW: CPT | Mod: S$GLB,,, | Performed by: INTERNAL MEDICINE

## 2019-06-06 PROBLEM — I10 ESSENTIAL HYPERTENSION: Status: ACTIVE | Noted: 2019-06-06

## 2019-06-06 PROBLEM — K21.00 GASTROESOPHAGEAL REFLUX DISEASE WITH ESOPHAGITIS: Status: ACTIVE | Noted: 2019-06-06

## 2019-06-20 PROBLEM — L03.90 CELLULITIS: Status: ACTIVE | Noted: 2019-06-20

## 2019-12-06 NOTE — SUBJECTIVE & OBJECTIVE
Past Medical History:   Diagnosis Date    PVC (premature ventricular contraction)        Past Surgical History:   Procedure Laterality Date    APPENDECTOMY      CHOLECYSTECTOMY         Review of patient's allergies indicates:   Allergen Reactions    Codeine Hallucinations    Iodine and iodide containing products Itching       Current Facility-Administered Medications on File Prior to Encounter   Medication    [COMPLETED] gadobutrol 7 mL    [COMPLETED] lorazepam injection 0.5 mg    [DISCONTINUED] niCARdipine 40 mg/200 mL infusion     Current Outpatient Prescriptions on File Prior to Encounter   Medication Sig    acebutolol (SECTRAL) 200 MG capsule Take 200 mg by mouth 2 (two) times daily.    fluoxetine (PROZAC) 20 MG capsule Take 20 mg by mouth once daily.    lansoprazole (PREVACID) 30 MG capsule Take 30 mg by mouth once daily.     Family History     None        Social History Main Topics    Smoking status: Never Smoker    Smokeless tobacco: Never Used    Alcohol use No    Drug use: No    Sexual activity: Yes     Partners: Female     Review of Systems   Constitutional: Negative for chills, fatigue and fever.   HENT: Negative for congestion and rhinorrhea.    Eyes: Negative for pain and redness.   Respiratory: Negative for cough, chest tightness, shortness of breath and wheezing.    Cardiovascular: Positive for palpitations. Negative for chest pain and leg swelling.   Gastrointestinal: Negative for abdominal pain, constipation, diarrhea, nausea and vomiting.   Endocrine: Negative for cold intolerance, heat intolerance, polydipsia and polyuria.   Genitourinary: Negative for dysuria and hematuria.   Musculoskeletal: Negative for arthralgias and myalgias.   Allergic/Immunologic: Negative for environmental allergies, food allergies and immunocompromised state.   Neurological: Negative for dizziness, light-headedness and headaches.   Hematological: Negative for adenopathy. Does not bruise/bleed easily.  Pt returned call, reviewed Dr Graff Portal 12/6/19 orders and verbalized understanding.   Psychiatric/Behavioral: Negative for agitation and confusion.     Objective:     Vital Signs (Most Recent):  Temp: 97.6 °F (36.4 °C) (10/18/17 1235)  Pulse: 60 (10/18/17 1235)  Resp: 16 (10/18/17 1235)  BP: (!) 141/70 (10/18/17 1235)  SpO2: (!) 93 % (10/18/17 1235) Vital Signs (24h Range):  Temp:  [97.4 °F (36.3 °C)-98.2 °F (36.8 °C)] 97.6 °F (36.4 °C)  Pulse:  [50-63] 60  Resp:  [16-19] 16  SpO2:  [92 %-100 %] 93 %  BP: (126-178)/(61-88) 141/70     Weight: 69.4 kg (153 lb)  Body mass index is 27.1 kg/m².    Physical Exam   Constitutional: He is oriented to person, place, and time. He appears well-developed and well-nourished. No distress.   HENT:   Head: Normocephalic and atraumatic.   Eyes: EOM are normal. Pupils are equal, round, and reactive to light.   Neck: Normal range of motion. Neck supple.   Cardiovascular: Normal rate, regular rhythm, normal heart sounds and intact distal pulses.  Exam reveals no gallop and no friction rub.    No murmur heard.  Pulmonary/Chest: Effort normal and breath sounds normal. No respiratory distress. He exhibits no tenderness.   Abdominal: Soft. Bowel sounds are normal. He exhibits no distension. There is no tenderness.   Musculoskeletal: Normal range of motion. He exhibits no edema, tenderness or deformity.   Neurological: He is alert and oriented to person, place, and time. No cranial nerve deficit. Coordination normal.   Skin: Skin is warm and dry. No rash noted. He is not diaphoretic. No erythema. No pallor.   Psychiatric: He has a normal mood and affect. His behavior is normal. Judgment and thought content normal.   Nursing note and vitals reviewed.      Significant Labs:   Recent Lab Results       10/17/17  2303      Alcohol, Urine <10     Benzodiazepines Negative     Methadone metabolites Negative     Phencyclidine Negative     Amphetamine Screen, Ur Negative     aPTT 23.7  Comment:  aPTT therapeutic range = 39-69 seconds     Barbiturate Screen, Ur Negative     Cocaine  (Metab.) Negative     Creatinine, Random Ur 104.0  Comment:  The random urine reference ranges provided were established   for 24 hour urine collections.  No reference ranges exist for  random urine specimens.  Correlate clinically.       Group & Rh O POS     INDIRECT ELSA NEG     Coumadin Monitoring INR 1.0  Comment:  Coumadin Therapy:  2.0 - 3.0 for INR for all indicators except mechanical heart valves  and antiphospholipid syndromes which should use 2.5 - 3.5.       Opiate Scrn, Ur Negative     Protime 11.1     Marijuana (THC) Metabolite Negative     Toxicology Information SEE COMMENT  Comment:  This screen includes the following classes of drugs at the   listed cut-off:  Benzodiazepines                  200 ng/ml  Methadone                        300 ng/ml  Cocaine metabolite               300 ng/ml  Opiates                          300 ng/ml  Barbiturates                     200 ng/ml  Amphetamines                    1000 ng/ml  Marijuana metabs (THC)            50 ng/ml  Phencyclidine (PCP)               25 ng/ml  High concentrations of Diphenhydramine may cross-react with  Phencyclidine PCP screening immunoassay giving a false   positive result.  High concentrations of Methylenedioxymethamphetamine (MDMA aka  Ectasy) and other structurally similar compounds may cross-   react with the Amphetamine/Methamphetamine screening   immunoassay giving a false positive result.  A metabolite of the anti-HIV drug Sustiva () may cause  false positive results in the Marijuana metabolite (THC)   screening assay.  Note: This exception list includes only more common   interferants in toxicology screen testing.  Because of many   cross-reactantspositive results on toxicology drug screens   should be confirmed whenever results do not correlate with   clinical presentation.  This report is intended for use in clinical monitoring and  management of patients. It is not intended for use in   employment related drug  testing.  Because of any cross-reactants, positive results on toxicology  drug screens should be confirmed whenever results do not  correlate with clinical presentation.  Presumptive positive results are unconfirmed and may be used   only for medical purposes.             Significant Imaging: EKG: I have reviewed all pertinent results/findings within the past 24 hours and my personal findings are: sinus bradycardia, 1st degree AV block  I have reviewed all pertinent imaging results/findings within the past 24 hours.

## 2020-02-03 PROBLEM — I61.9 INTRACEREBRAL HEMORRHAGE: Status: ACTIVE | Noted: 2017-10-01

## 2022-09-01 PROBLEM — U07.1 COVID-19: Status: ACTIVE | Noted: 2022-08-31

## 2023-02-10 PROBLEM — I10 ESSENTIAL HYPERTENSION: Chronic | Status: ACTIVE | Noted: 2019-06-06

## 2023-02-10 PROBLEM — R93.1 AGATSTON CORONARY ARTERY CALCIUM SCORE LESS THAN 100: Status: RESOLVED | Noted: 2021-02-26 | Resolved: 2023-02-10

## 2023-02-10 PROBLEM — R93.1 AGATSTON CORONARY ARTERY CALCIUM SCORE LESS THAN 100: Status: ACTIVE | Noted: 2021-02-26

## 2023-02-10 PROBLEM — L03.90 CELLULITIS: Status: RESOLVED | Noted: 2019-06-20 | Resolved: 2023-02-10

## 2023-02-10 PROBLEM — I61.9 ICH (INTRACEREBRAL HEMORRHAGE): Status: RESOLVED | Noted: 2017-10-17 | Resolved: 2023-02-10

## 2023-02-10 PROBLEM — U07.1 COVID-19: Status: RESOLVED | Noted: 2022-08-31 | Resolved: 2023-02-10

## 2023-04-26 NOTE — ASSESSMENT & PLAN NOTE
-Stroke risk factor. 2D Echo done  30 day event monitor to be ordered on discharge   Referral to cards    Detail Level: Zone Note Text (......Xxx Chief Complaint.): This diagnosis correlates with the Other (Free Text): kidney disease.  not a candidate for otezla.  controlled with topical steroids. Render Risk Assessment In Note?: no

## 2023-08-14 PROBLEM — E55.9 VITAMIN D DEFICIENCY: Status: ACTIVE | Noted: 2023-08-14

## 2023-08-17 PROBLEM — R73.9 HYPERGLYCEMIA: Status: ACTIVE | Noted: 2023-08-17

## 2023-09-22 PROBLEM — F43.0 AGITATION STATES AS ACUTE REACTION TO EXCEPTIONAL (GROSS) STRESS: Status: ACTIVE | Noted: 2023-09-22
